# Patient Record
Sex: FEMALE | Race: WHITE
[De-identification: names, ages, dates, MRNs, and addresses within clinical notes are randomized per-mention and may not be internally consistent; named-entity substitution may affect disease eponyms.]

---

## 2018-01-18 ENCOUNTER — HOSPITAL ENCOUNTER (OUTPATIENT)
Dept: HOSPITAL 17 - NEPC | Age: 53
Setting detail: OBSERVATION
LOS: 1 days | Discharge: HOME | End: 2018-01-19
Attending: HOSPITALIST | Admitting: HOSPITALIST
Payer: COMMERCIAL

## 2018-01-18 VITALS
TEMPERATURE: 98.6 F | HEART RATE: 98 BPM | SYSTOLIC BLOOD PRESSURE: 143 MMHG | RESPIRATION RATE: 18 BRPM | DIASTOLIC BLOOD PRESSURE: 72 MMHG | OXYGEN SATURATION: 95 %

## 2018-01-18 VITALS
DIASTOLIC BLOOD PRESSURE: 63 MMHG | TEMPERATURE: 99.5 F | HEART RATE: 102 BPM | RESPIRATION RATE: 17 BRPM | SYSTOLIC BLOOD PRESSURE: 132 MMHG | OXYGEN SATURATION: 94 %

## 2018-01-18 VITALS
DIASTOLIC BLOOD PRESSURE: 68 MMHG | OXYGEN SATURATION: 100 % | RESPIRATION RATE: 18 BRPM | TEMPERATURE: 98.1 F | SYSTOLIC BLOOD PRESSURE: 144 MMHG | HEART RATE: 89 BPM

## 2018-01-18 VITALS — HEIGHT: 64 IN | BODY MASS INDEX: 27.1 KG/M2 | WEIGHT: 158.73 LBS

## 2018-01-18 VITALS
OXYGEN SATURATION: 97 % | HEART RATE: 67 BPM | RESPIRATION RATE: 18 BRPM | DIASTOLIC BLOOD PRESSURE: 69 MMHG | SYSTOLIC BLOOD PRESSURE: 132 MMHG

## 2018-01-18 VITALS — HEART RATE: 93 BPM

## 2018-01-18 VITALS
OXYGEN SATURATION: 100 % | HEART RATE: 75 BPM | RESPIRATION RATE: 18 BRPM | DIASTOLIC BLOOD PRESSURE: 72 MMHG | SYSTOLIC BLOOD PRESSURE: 156 MMHG

## 2018-01-18 VITALS — OXYGEN SATURATION: 97 %

## 2018-01-18 VITALS — TEMPERATURE: 97.8 F

## 2018-01-18 DIAGNOSIS — Z90.710: ICD-10-CM

## 2018-01-18 DIAGNOSIS — N17.9: ICD-10-CM

## 2018-01-18 DIAGNOSIS — E78.5: ICD-10-CM

## 2018-01-18 DIAGNOSIS — D72.829: ICD-10-CM

## 2018-01-18 DIAGNOSIS — T42.4X1A: ICD-10-CM

## 2018-01-18 DIAGNOSIS — Z72.0: ICD-10-CM

## 2018-01-18 DIAGNOSIS — T40.2X1A: ICD-10-CM

## 2018-01-18 DIAGNOSIS — Z63.0: ICD-10-CM

## 2018-01-18 DIAGNOSIS — F32.9: ICD-10-CM

## 2018-01-18 DIAGNOSIS — F05: ICD-10-CM

## 2018-01-18 DIAGNOSIS — I10: ICD-10-CM

## 2018-01-18 DIAGNOSIS — G93.41: Primary | ICD-10-CM

## 2018-01-18 LAB
ALBUMIN SERPL-MCNC: 3.9 GM/DL (ref 3.4–5)
ALP SERPL-CCNC: 91 U/L (ref 45–117)
ALT SERPL-CCNC: 23 U/L (ref 10–53)
APAP SERPL-MCNC: (no result) MCG/ML (ref 10–30)
AST SERPL-CCNC: 33 U/L (ref 15–37)
BASOPHILS # BLD AUTO: 0 TH/MM3 (ref 0–0.2)
BASOPHILS NFR BLD: 0.2 % (ref 0–2)
BILIRUB SERPL-MCNC: 0.3 MG/DL (ref 0.2–1)
BUN SERPL-MCNC: 20 MG/DL (ref 7–18)
CALCIUM SERPL-MCNC: 9.4 MG/DL (ref 8.5–10.1)
CHLORIDE SERPL-SCNC: 104 MEQ/L (ref 98–107)
CREAT SERPL-MCNC: 1.34 MG/DL (ref 0.5–1)
EOSINOPHIL # BLD: 0 TH/MM3 (ref 0–0.4)
EOSINOPHIL NFR BLD: 0 % (ref 0–4)
ERYTHROCYTE [DISTWIDTH] IN BLOOD BY AUTOMATED COUNT: 13.5 % (ref 11.6–17.2)
GFR SERPLBLD BASED ON 1.73 SQ M-ARVRAT: 41 ML/MIN (ref 89–?)
GLUCOSE SERPL-MCNC: 101 MG/DL (ref 74–106)
HCO3 BLD-SCNC: 26.2 MEQ/L (ref 21–32)
HCT VFR BLD CALC: 40.6 % (ref 35–46)
HGB BLD-MCNC: 13.4 GM/DL (ref 11.6–15.3)
INR PPP: 1.1 RATIO
LYMPHOCYTES # BLD AUTO: 2 TH/MM3 (ref 1–4.8)
LYMPHOCYTES NFR BLD AUTO: 12.1 % (ref 9–44)
MCH RBC QN AUTO: 30.9 PG (ref 27–34)
MCHC RBC AUTO-ENTMCNC: 33 % (ref 32–36)
MCV RBC AUTO: 93.7 FL (ref 80–100)
MONOCYTE #: 1.2 TH/MM3 (ref 0–0.9)
MONOCYTES NFR BLD: 7.4 % (ref 0–8)
NEUTROPHILS # BLD AUTO: 13.3 TH/MM3 (ref 1.8–7.7)
NEUTROPHILS NFR BLD AUTO: 80.3 % (ref 16–70)
PLATELET # BLD: 450 TH/MM3 (ref 150–450)
PMV BLD AUTO: 8.2 FL (ref 7–11)
PROT SERPL-MCNC: 7.6 GM/DL (ref 6.4–8.2)
PROTHROMBIN TIME: 10.7 SEC (ref 9.8–11.6)
RBC # BLD AUTO: 4.34 MIL/MM3 (ref 4–5.3)
SODIUM SERPL-SCNC: 139 MEQ/L (ref 136–145)
WBC # BLD AUTO: 16.6 TH/MM3 (ref 4–11)

## 2018-01-18 PROCEDURE — G0378 HOSPITAL OBSERVATION PER HR: HCPCS

## 2018-01-18 PROCEDURE — 96361 HYDRATE IV INFUSION ADD-ON: CPT

## 2018-01-18 PROCEDURE — 96372 THER/PROPH/DIAG INJ SC/IM: CPT

## 2018-01-18 PROCEDURE — 71045 X-RAY EXAM CHEST 1 VIEW: CPT

## 2018-01-18 PROCEDURE — 80048 BASIC METABOLIC PNL TOTAL CA: CPT

## 2018-01-18 PROCEDURE — 96374 THER/PROPH/DIAG INJ IV PUSH: CPT

## 2018-01-18 PROCEDURE — 80307 DRUG TEST PRSMV CHEM ANLYZR: CPT

## 2018-01-18 PROCEDURE — 96375 TX/PRO/DX INJ NEW DRUG ADDON: CPT

## 2018-01-18 PROCEDURE — 85610 PROTHROMBIN TIME: CPT

## 2018-01-18 PROCEDURE — 80076 HEPATIC FUNCTION PANEL: CPT

## 2018-01-18 PROCEDURE — 99285 EMERGENCY DEPT VISIT HI MDM: CPT

## 2018-01-18 PROCEDURE — 80053 COMPREHEN METABOLIC PANEL: CPT

## 2018-01-18 PROCEDURE — 85025 COMPLETE CBC W/AUTO DIFF WBC: CPT

## 2018-01-18 PROCEDURE — 85730 THROMBOPLASTIN TIME PARTIAL: CPT

## 2018-01-18 PROCEDURE — 93005 ELECTROCARDIOGRAM TRACING: CPT

## 2018-01-18 PROCEDURE — 97162 PT EVAL MOD COMPLEX 30 MIN: CPT

## 2018-01-18 RX ADMIN — STANDARDIZED SENNA CONCENTRATE AND DOCUSATE SODIUM SCH TAB: 8.6; 5 TABLET, FILM COATED ORAL at 20:47

## 2018-01-18 RX ADMIN — PHENYTOIN SODIUM SCH MLS/HR: 50 INJECTION INTRAMUSCULAR; INTRAVENOUS at 11:55

## 2018-01-18 RX ADMIN — PHENYTOIN SODIUM SCH MLS/HR: 50 INJECTION INTRAMUSCULAR; INTRAVENOUS at 21:00

## 2018-01-18 RX ADMIN — Medication SCH ML: at 20:47

## 2018-01-18 SDOH — SOCIAL STABILITY - SOCIAL INSECURITY: PROBLEMS IN RELATIONSHIP WITH SPOUSE OR PARTNER: Z63.0

## 2018-01-18 NOTE — PD.PSY.CON
Provisional Diagnosis


Admission Date


Jan 18, 2018 at 10:33


[place].


Major depressive disorder, recurrent, severe, delirium due to benzodiazepine 

overdose


[place] II.


Deferred





History of Present Illness


Service


Psychiatry


Consult Requested By


ER team


Reason for Consult


SI


Primary Care Physician


Isatu Ramos MD


HPI


The patient is a 53 year-old woman domiciled [place]  in [place], 

self employed, with psychiatry history of depression and anxiety , no 

hospitalizations, no SA, she is in Wellbutrin 100 mg bid prescribed by PCP,  

medical history of right subclavian steal syndrome, HTN, HLD,  who presents to 

[facility] with AMS.  Patient was found by her  early this morning 

in her bed unarousable with benzodiazepines and opiates at her beside.  EMS was 

called to the house and patient had GCS score of 8.  Her  is at the 

bedside and due to the patients cognitive impairment, all history is obtained 

from discussion with the , ED physician and review of the medical 

record.  The  states they have been having some martial difficulties and 

he has not been staying at the house but still goes to check on her.  He went 

in to check on her this morning and was not able to get her to wake up.  He 

denies that she drinks any alcohol or uses any illicit drugs.  In the ED, 

patients GCS was around 12.  Her UDS positive for opiates and benzodiazepines. 

On psychiatric evaluation patient is non arausable, sedated, no able to 

participate in the interview.  I have collateral information from her  

Chandu Jordan.  He says that the patient has been in a lot of stress, 

overwhelmed and anxious in the last week.  He says that he has an strong 

suspicion that she overdosed with suicidal intentions.  He relates that they 

have been marital problems for months now, but in the last week does probably 

have exacerbated and few days ago he left the house and asked her for divorce.  

He clarifies that the patient has history of anxiety and depression and she is 

in psychotropics, but he doesn't know the name of the medicine and strength.





Review of Systems


ROS Limitations:  Unresponsive, Uncooperative





Past Family Social History


Coded Allergies:  


     Sulfa (Sulfonamide Antibiotics) (Unverified  Allergy, Severe, 8/15/17)


Reported Medications


Simvastatin (Simvastatin) 20 Mg Tab, 20 MG PO HS


   7/10/13


Discontinued Reported Medications


Bupropion Hcl (Wellbutrin Sr) 100 Mg Tab, 100 MG PO DAILY, TAB


   4/25/16


Zolpidem Tartrate (Zolpidem Tartrate) 10 Mg Tab, 10 MG PO HS


   7/10/13


[Estrgn Methtest ]   No Conflict Check, PO DAILY


   7/10/13





Current Medications








 Medications


  (Trade)  Dose


 Ordered  Sig/Maverick


 Route  Start Time


 Stop Time Status Last Admin


 


  (NS Flush)  2 ml  UNSCH  PRN


 IVF  1/18/18 07:30


     


 


 


 Sodium Chloride  1,000 ml @ 


 100 mls/hr  Q10H


 IV  1/18/18 11:00


    1/18/18 11:55


 


 


  (NS Flush)  2 ml  UNSCH  PRN


 IV FLUSH  1/18/18 10:30


     


 


 


  (NS Flush)  2 ml  BID


 IV FLUSH  1/18/18 21:00


     


 


 


  (Tylenol)  650 mg  Q4H  PRN


 PO  1/18/18 10:30


     


 


 


  (Zofran Inj)  4 mg  Q6H  PRN


 IVP  1/18/18 10:30


     


 


 


  (Lovenox Inj)  40 mg  Q24H


 SQ  1/18/18 12:00


    1/18/18 11:55


 


 


  (Narcan Inj)  0.4 mg  UNSCH  PRN


 IV PUSH  1/18/18 10:30


     


 


 


  (Bree-Colace)  1 tab  BID


 PO  1/18/18 21:00


     


 


 


  (Milk Of


 Magnesia Liq)  30 ml  Q12H  PRN


 PO  1/18/18 10:30


     


 


 


  (Senokot)  17.2 mg  Q12H  PRN


 PO  1/18/18 10:30


     


 


 


  (Dulcolax Supp)  10 mg  DAILY  PRN


 RECTAL  1/18/18 10:30


     


 


 


  (Lactulose Liq)  30 ml  DAILY  PRN


 PO  1/18/18 10:30


     


 








Family Psych History


No family psychiatric history


Social History


Patient was born and raised in [place], she lives in [place] with 

, is self employed, her highest level of education is has


Patient's Strengths (min. 2)


Family support





Physical Exam


Patient is the sedated


Vital Signs





Vital Signs








  Date Time  Temp Pulse Resp B/P (MAP) Pulse Ox O2 Delivery O2 Flow Rate FiO2


 


1/18/18 12:48  67 18 132/69 (90) 97 Room Air  


 


1/18/18 10:56        21


 


1/18/18 07:29 97.8       














I/O   


 


 1/18/18 1/18/18 1/19/18





 08:00 16:00 00:00


 


Intake Total  1000 ml 


 


Balance  1000 ml 








Lab Results











Test


  1/18/18


07:45 1/18/18


09:30


 


White Blood Count 16.6 TH/MM3  


 


Red Blood Count 4.34 MIL/MM3  


 


Hemoglobin 13.4 GM/DL  


 


Hematocrit 40.6 %  


 


Mean Corpuscular Volume 93.7 FL  


 


Mean Corpuscular Hemoglobin 30.9 PG  


 


Mean Corpuscular Hemoglobin


Concent 33.0 % 


  


 


 


Red Cell Distribution Width 13.5 %  


 


Platelet Count 450 TH/MM3  


 


Mean Platelet Volume 8.2 FL  


 


Neutrophils (%) (Auto) 80.3 %  


 


Lymphocytes (%) (Auto) 12.1 %  


 


Monocytes (%) (Auto) 7.4 %  


 


Eosinophils (%) (Auto) 0.0 %  


 


Basophils (%) (Auto) 0.2 %  


 


Neutrophils # (Auto) 13.3 TH/MM3  


 


Lymphocytes # (Auto) 2.0 TH/MM3  


 


Monocytes # (Auto) 1.2 TH/MM3  


 


Eosinophils # (Auto) 0.0 TH/MM3  


 


Basophils # (Auto) 0.0 TH/MM3  


 


CBC Comment DIFF FINAL  


 


Differential Comment   


 


Prothrombin Time 10.7 SEC  


 


Prothromb Time International


Ratio 1.1 RATIO 


  


 


 


Activated Partial


Thromboplast Time 20.0 SEC 


  


 


 


Blood Urea Nitrogen 20 MG/DL  


 


Creatinine 1.34 MG/DL  


 


Random Glucose 101 MG/DL  


 


Total Protein 7.6 GM/DL  


 


Albumin 3.9 GM/DL  


 


Calcium Level 9.4 MG/DL  


 


Alkaline Phosphatase 91 U/L  


 


Aspartate Amino Transf


(AST/SGOT) 33 U/L 


  


 


 


Alanine Aminotransferase


(ALT/SGPT) 23 U/L 


  


 


 


Total Bilirubin 0.3 MG/DL  


 


Sodium Level 139 MEQ/L  


 


Potassium Level 3.8 MEQ/L  


 


Chloride Level 104 MEQ/L  


 


Carbon Dioxide Level 26.2 MEQ/L  


 


Anion Gap 9 MEQ/L  


 


Estimat Glomerular Filtration


Rate 41 ML/MIN 


  


 


 


Salicylates Level


  LESS THAN 1.7


MG/DL 


 


 


Acetaminophen Level


  LESS THAN 2.0


MCG/ML 


 


 


Ethyl Alcohol Level


  LESS THAN 3


MG/DL 


 


 


Urine Opiates Screen  POS 


 


Urine Barbiturates Screen  NEG 


 


Urine Amphetamines Screen  NEG 


 


Urine Benzodiazepines Screen  POS 


 


Urine Cocaine Screen  NEG 


 


Urine Cannabinoids Screen  NEG 











Mental Status Examination


Appearance:  Disheveled (Limited MSE due to level of sedation )


Consciousness:  Lethargic


Suicidal Ideation:  Yes





Assessment & Plan


Problem List:  


(1) Delirium due to another medical condition


ICD Codes:  F05 - Delirium due to known physiological condition


(2) Benzodiazepine (tranquilizer) overdose


ICD Codes:  T42.4X1A - Poisoning by benzodiazepines, accidental (unintentional)

, initial encounter


Status:  Acute


(3) Major depressive disorder, single episode


ICD Codes:  F32.9 - Major depressive disorder, single episode, unspecified


Assessment & Plan:  At the moment of the evaluation patient is deeply slept, 

sedated after OD with benzodiazepines. Patient has been reportedly disorganized 

and agitated on her arrival in the ER mos probably due to delirium secondary to 

benzodiazepines intoxication.  As patient's  is highly like that she 

overdosed with suicidal intentions because he left the house last Saturday and 

Wednesday they have an argument again and she suggested she was to commit 

suicide. At this moment patient represents an acute risk of danger to self and 

she meets criteria for involuntary psychiatric admission for stabilization. 

Dimitri reza act the patient. CIWA protocol. Follow up poison control protocol. 

Case was discussed with Dr. Sesay. Transfer to [facility] once medically 

apposite. 
























































Assessment & Plan


Estimated LOS:  days





Problem Qualifiers





(1) Benzodiazepine (tranquilizer) overdose:  


Qualified Codes:  T42.4X4A - Poisoning by benzodiazepines, undetermined, 

initial encounter








Guevara Estrada MD Jan 18, 2018 14:00

## 2018-01-18 NOTE — RADRPT
EXAM DATE/TIME:  01/18/2018 07:50 

 

[facility] COMPARISON:     

No previous studies available for comparison.

 

                     

INDICATIONS :     

Short of breath; AMS. 

                     

 

MEDICAL HISTORY :            

Unobtainable.    

 

SURGICAL HISTORY :        

Unobtainable. 

 

ENCOUNTER:     

Initial                                        

 

ACUITY:     

1 day      

 

PAIN SCORE:     

Non-responsive.

 

LOCATION:     

Bilateral chest 

 

FINDINGS:     

Portable AP view of the chest demonstrates a normal-sized cardiac silhouette. There is symmetric biap
ical pleural-parenchymal opacity, likely representing scar. No pleural effusion, airspace consolidati
on, or pneumothorax identified. The bones and soft tissues demonstrate no acute finding.

 

CONCLUSION:     

Symmetric biapical opacity likely representing pleural parenchymal scar. Otherwise, no acute finding 
is identified.

 

 

 

 Jerod Turpin MD on January 18, 2018 at 8:07           

Board Certified Radiologist.

 This report was verified electronically.

## 2018-01-18 NOTE — HHI.HP
__________________________________________________





HPI


Service


[facility]


Primary Care Physician


Isatu Ramos MD


Admission Diagnosis





Polysubstance OD; AMS


Diagnoses:  


Chief Complaint:  


AMS


Travel History


International Travel<30 Days:  No


Contact w/Intl Traveler <30 Da:  No


Traveled to Known Affected Are:  No


History of Present Illness


Written by Katelyn Carpio, acting as scribe for Dr. Sesay on 18 at 11:

53. 





This is a 54yo female with PMHX significant for right subclavian steal syndrome

, HTN, HLD and depression [facility].  Patient was 

found by her  early this morning in her bed unarousable with 

benzodiazepines and opiates at her beside.  EMS was called to the house and 

patient had GCS score of 8.  Her  is at the bedside and due to the 

patients cognitive impairment, all history is obtained from discussion with the 

, ED physician and review of the medical record.  The  states 

they have been having some martial difficulties and he has not been staying at 

the house but still goes to check on her.  He went in to check on her this 

morning and was not able to get her to wake up.  He denies that she drinks any 

alcohol or uses any illicit drugs.  In the ED, patients GCS was around 12.  Her 

UDS positive for opiates and benzodiazepines.





Review of Systems


10 point review of systems attempted but unable to complete due to patients 

altered mental status





Past Family Social History


Past Medical History


HTN


HLD


Right subclavian steal syndrome


Depression


Past Surgical History


Breast augmentation


Hysterectomy


Angioplasty right subclavian 2013


Reported Medications


Wellbutrin Sr (Bupropion HCl) 100 Mg Tab 100 Mg PO DAILY 


Simvastatin 20 Mg Tab 20 Mg PO HS 


Zolpidem Tartrate 10 Mg Tab 10 Mg PO HS 


[Estrgn Methtest ]    PO DAILY


Allergies:  


Coded Allergies:  


     Sulfa (Sulfonamide Antibiotics) (Unverified  Allergy, Severe, 8/15/17)


Active Ordered Medications





Current Medications








 Medications


  (Trade)  Dose


 Ordered  Sig/Maverick


 Route  Start Time


 Stop Time Status Last Admin


 


  (NS Flush)  2 ml  UNSCH  PRN


 IVF  18 07:30


     


 


 


 Sodium Chloride  1,000 ml @ 


 100 mls/hr  Q10H


 IV  18 11:00


     


 


 


  (NS Flush)  2 ml  UNSCH  PRN


 IV FLUSH  18 10:30


     


 


 


  (NS Flush)  2 ml  BID


 IV FLUSH  18 21:00


     


 


 


  (Tylenol)  650 mg  Q4H  PRN


 PO  18 10:30


     


 


 


  (Zofran Inj)  4 mg  Q6H  PRN


 IVP  18 10:30


     


 


 


  (Lovenox Inj)  40 mg  Q24H


 SQ  18 12:00


     


 


 


  (Narcan Inj)  0.4 mg  UNSCH  PRN


 IV PUSH  18 10:30


     


 


 


  (Bree-Colace)  1 tab  BID


 PO  18 21:00


     


 


 


  (Milk Of


 Magnesia Liq)  30 ml  Q12H  PRN


 PO  18 10:30


     


 


 


  (Senokot)  17.2 mg  Q12H  PRN


 PO  18 10:30


     


 


 


  (Dulcolax Supp)  10 mg  DAILY  PRN


 RECTAL  18 10:30


     


 


 


  (Lactulose Liq)  30 ml  DAILY  PRN


 PO  18 10:30


     


 








Family History


Mother, CVA


Father, lung cancer


Hypertension


Coronary artery disease


Social History


Patient has a history of tobacco use of a half pack per day.  Denies any 

alcohol use or illicit drug use.





Physical Exam


Vital Signs





Vital Signs








  Date Time  Temp Pulse Resp B/P (MAP) Pulse Ox O2 Delivery O2 Flow Rate FiO2


 


18 10:56     97   21


 


18 07:52     97 Room Air  


 


18 07:52     97 Room Air  


 


18 07:29 97.8       








Physical Exam


GENERAL: This is a well-nourished, well-developed  female patient.  

Lethargic.  Confused.  Mumbling incomprehensibly.   at the bedside.


SKIN: No rashes, ecchymoses or lesions. Cool and dry.


HEAD: Atraumatic. Normocephalic. No temporal or scalp tenderness.


EYES: Pupils equal round and reactive. No scleral icterus. No injection or 

drainage. 


ENT: Nose without bleeding or purulent drainage. Throat without erythema, 

tonsillar hypertrophy or exudate. Uvula midline. Airway patent.  Dry mucus 

membranes.


NECK: Trachea midline. No lymphadenopathy. Supple, nontender, no meningeal 

signs.


CARDIOVASCULAR: Regular rate and rhythm without murmurs, gallops, or rubs. 


RESPIRATORY: Poor effort.  Clear to auscultation. Breath sounds equal 

bilaterally. No wheezes, rales, or rhonchi.  


GASTROINTESTINAL: Abdomen soft, non-tender, nondistended. No hepato-splenomegaly

, or palpable masses. No guarding.


MUSCULOSKELETAL: Extremities without clubbing, cyanosis, or edema. No joint 

tenderness, effusion, or edema noted. No calf tenderness. 


NEUROLOGICAL: Lethargic.  Does not follow simple commands.  Able to move all 

extremities spontaneously.


Laboratory





Laboratory Tests








Test


  18


07:45 18


09:30


 


White Blood Count 16.6  


 


Red Blood Count 4.34  


 


Hemoglobin 13.4  


 


Hematocrit 40.6  


 


Mean Corpuscular Volume 93.7  


 


Mean Corpuscular Hemoglobin 30.9  


 


Mean Corpuscular Hemoglobin


Concent 33.0 


  


 


 


Red Cell Distribution Width 13.5  


 


Platelet Count 450  


 


Mean Platelet Volume 8.2  


 


Neutrophils (%) (Auto) 80.3  


 


Lymphocytes (%) (Auto) 12.1  


 


Monocytes (%) (Auto) 7.4  


 


Eosinophils (%) (Auto) 0.0  


 


Basophils (%) (Auto) 0.2  


 


Neutrophils # (Auto) 13.3  


 


Lymphocytes # (Auto) 2.0  


 


Monocytes # (Auto) 1.2  


 


Eosinophils # (Auto) 0.0  


 


Basophils # (Auto) 0.0  


 


CBC Comment DIFF FINAL  


 


Differential Comment   


 


Prothrombin Time 10.7  


 


Prothromb Time International


Ratio 1.1 


  


 


 


Activated Partial


Thromboplast Time 20.0 


  


 


 


Blood Urea Nitrogen 20  


 


Creatinine 1.34  


 


Random Glucose 101  


 


Total Protein 7.6  


 


Albumin 3.9  


 


Calcium Level 9.4  


 


Alkaline Phosphatase 91  


 


Aspartate Amino Transf


(AST/SGOT) 33 


  


 


 


Alanine Aminotransferase


(ALT/SGPT) 23 


  


 


 


Total Bilirubin 0.3  


 


Sodium Level 139  


 


Potassium Level 3.8  


 


Chloride Level 104  


 


Carbon Dioxide Level 26.2  


 


Anion Gap 9  


 


Estimat Glomerular Filtration


Rate 41 


  


 


 


Salicylates Level LESS THAN 1.7  


 


Acetaminophen Level LESS THAN 2.0  


 


Ethyl Alcohol Level LESS THAN 3  


 


Urine Opiates Screen  POS 


 


Urine Barbiturates Screen  NEG 


 


Urine Amphetamines Screen  NEG 


 


Urine Benzodiazepines Screen  POS 


 


Urine Cocaine Screen  NEG 


 


Urine Cannabinoids Screen  NEG 








Result Diagram:  


18 0745                                                                   

             1845





Imaging





Last Impressions








Chest X-Ray 18 Signed





Impressions: 





 Service Date/Time:   07:50 - CONCLUSION:  Symmetric 





 biapical opacity likely representing pleural parenchymal scar. Otherwise, no 





 acute finding is identified.     John W. Gianini, MD Caprini VTE Risk Assessment


Caprini VTE Risk Assessment:  Mod/High Risk (score >= 2)


Caprini Risk Assessment Model











 Point Value = 1          Point Value = 2  Point Value = 3  Point Value = 5


 


Age 41-60


Minor surgery


BMI > 25 kg/m2


Swollen legs


Varicose veins


Pregnancy or postpartum


History of unexplained or recurrent


   spontaneous 


Oral contraceptives or hormone


   replacement


Sepsis (< 1 month)


Serious lung disease, including


   pneumonia (< 1 month)


Abnormal pulmonary function


Acute myocardial infarction


Congestive heart failure (< 1 month)


History of inflammatory bowel disease


Medical patient at bed rest Age 61-74


Arthroscopic surgery


Major open surgery (> 45 min)


Laparoscopic surgery (> 45 min)


Malignancy


Confined to bed (> 72 hours)


Immobilizing plaster cast


Central venous access Age >= 75


History of VTE


Family history of VTE


Factor V Leiden


Prothrombin 52033F


Lupus anticoagulant


Anticardiolipin antibodies


Elevated serum homocysteine


Heparin-induced thrombocytopenia


Other congenital or acquired


   thrombophilia Stroke (< 1 month)


Elective arthroplasty


Hip, pelvis, or leg fracture


Acute spinal cord injury (< 1 month)








Prophylaxis Regimen











   Total Risk


Factor Score Risk Level Prophylaxis Regimen


 


0-1      Low Early ambulation


 


2 Moderate Order ONE of the following:


*Sequential Compression Device (SCD)


*Heparin 5000 units SQ BID


 


3-4 Higher Order ONE of the following medications:


*Heparin 5000 units SQ TID


*Enoxaparin/Lovenox 40 mg SQ daily (WT < 150 kg, CrCl > 30 mL/min)


*Enoxaparin/Lovenox 30 mg SQ daily (WT < 150 kg, CrCl > 10-29 mL/min)


*Enoxaparin/Lovenox 30 mg SQ BID (WT < 150 kg, CrCl > 30 mL/min)


AND/OR


*Sequential Compression Device (SCD)


 


5 or more Highest Order ONE of the following medications:


*Heparin 5000 units SQ TID (Preferred with Epidurals)


*Enoxaparin/Lovenox 40 mg SQ daily (WT < 150 kg, CrCl > 30 mL/min)


*Enoxaparin/Lovenox 30 mg SQ daily (WT < 150 kg, CrCl > 10-29 mL/min)


*Enoxaparin/Lovenox 30 mg SQ BID (WT < 150 kg, CrCl > 30 mL/min)


AND


*Sequential Compression Device (SCD)











Assessment and Plan


Assessment and Plan


54yo female with PMHX significant for right subclavian steal syndrome, HTN, HLD 

and depression [facility].  Patient was found by 

her  early this morning in her bed unarousable with benzodiazepines and 

opiates at her beside.





Altered mental status/metabolic encephalopathy


Possible intentional drug overdose


History of depression


   - UDS positive for opiates and benzodiazepine.  Neg for ethyl alcohol.


   - Psychiatry consulted by ED, appreciate recommendations


   - Avoid all sedating medications


   - Neuro checks


   - IVF


   - Monitor respiratory status


   -Baker act 





Acute kidney injury


   - suspect secondary to dehydration/poor oral intake


   - Creatinine 1.34, GFR 41


   - Avoid nephrotoxic agent


   - IVF


   - Monitor kidney function





Leukocytosis


   - suspect reactive


   - patient is afebrile.  CXR reviewed by me, no acute cardiopulmonary process


   - monitor white count





Hypertension/Dyslipidemia


   - will resume home medications once med reconciliation updated





DVT prophylaxis


   - Lovenox subcutaneous








This note was transcribed by ERASMO Ha .  I, Dr. Annette Sesay 

personally performed the history, physical exam, and medical decision making; 

and confirmed the accuracy of the information in the transcribed note.





Authenticated by Dr. Annette Sesay on 18 at 11:53.


Discussed Condition With


ED physician, patient, , Katelyn Perez 2018 11:58


Annette Sesay MD 2018 13:46

## 2018-01-18 NOTE — EKG
Date Performed: 01/18/2018       Time Performed: 11:50:37

 

PTAGE:      53 years

 

EKG:      Sinus rhythm 

 

 NORMAL ECG

 

PREVIOUS TRACING       : 07/10/2013 13.03

 

DOCTOR:   Jeremy Barney  Interpretating Date/Time  01/18/2018 18:53:34

## 2018-01-18 NOTE — EKG
Date Performed: 01/18/2018       Time Performed: 16:35:30

 

PTAGE:      53 years

 

EKG:      Sinus rhythm 

 

 MINIMAL ST DEPRESSION BORDERLINE ECG No significant change from prior electrocardiogram. 

 

 PREVIOUS TRACING            : 01/18/2018 11.50

 

DOCTOR:   Holden Howard  Interpretating Date/Time  01/18/2018 19:38:06

## 2018-01-18 NOTE — PD
HPI


Chief Complaint:  Altered mental status


Time Seen by Provider:  07:29


Travel History


International Travel<30 days:  No


Contact w/Intl Traveler<30days:  No





History of Present Illness


HPI


53-year-old female arrived to the ER by EMS secondary to altered mental status.

  She has found by her  who works nights to have been asleep in her bed 

in very slow to arousal with tactile stimulus.  EMS reports hydrocodone in 

lorazepam prescriptions at the bedside.  EMS an odor of alcohol or seeing 

alcohol on scene.  Initially a GCS of 8.  After Narcan it improved to about 10 

or so.  The blood glucose in vital signs were normal aside from a respiratory 

rate of 6 initially.





PFSH


Past Medical History


Depression:  Yes


Cancer:  No


Cardiovascular Problems:  Yes (HISTORY OF RT SUBCLAVIAN STEAL SYNDROME)


High Cholesterol:  Yes


Chest Pain:  Yes


Diminished Hearing:  No


Endocrine:  No


Genitourinary:  No


Hypertension:  Yes


Immune Disorder:  No


Musculoskeletal:  No


Neurologic:  No


Psychiatric:  Yes


Reproductive:  No


Respiratory:  No


Menopausal:  Yes





Past Surgical History


Hysterectomy:  Yes


Thoracic Surgery:  Yes (BREAST AUGMENTATION)





Social History


Alcohol Use:  No


Tobacco Use:  Yes (1/2PPD)


Substance Use:  No





Allergies-Medications


(Allergen,Severity, Reaction):  


Coded Allergies:  


     Sulfa (Sulfonamide Antibiotics) (Unverified  Allergy, Severe, 8/15/17)


Reported Meds & Prescriptions





Reported Meds & Active Scripts


Active


Reported


Wellbutrin Sr (Bupropion HCl) 100 Mg Tab 100 Mg PO DAILY 


Simvastatin 20 Mg Tab 20 Mg PO HS 


Zolpidem Tartrate 10 Mg Tab 10 Mg PO HS 


[Estrgn Methtest ]    PO DAILY 








Review of Systems


ROS Limitations:  Clinical Condition





Physical Exam


Narrative


GENERAL: 53-year-old female well-nourished well-developed a GCS 12


SKIN: Warm and dry.  No evidence IV drug abuse.


HEAD: Atraumatic. Normocephalic. 


EYES:   No scleral icterus. No injection or drainage.  Pupils equal round 

reactive to light.  Pupils about 3 mm.


ENT: No nasal bleeding or discharge.  Mucous membranes pink and moist.


NECK: Trachea midline. No JVD. 


CARDIOVASCULAR: Heart rate about 100.  The rhythm is regular


RESPIRATORY: No accessory muscle use. Clear to auscultation. Breath sounds 

equal bilaterally. 


GASTROINTESTINAL: Abdomen soft, non-tender, nondistended. Hepatic and splenic 

margins not palpable. 


MUSCULOSKELETAL: Extremities without clubbing, cyanosis, or edema. No obvious 

deformities. 


NEUROLOGICAL: GCS is 12  (eyes 4, verbal 4, motor 4).  No focal cranial nerve 

deficit.


PSYCHIATRIC: Presumed polysubstance ingestion of indeterminate intent.  Unable 

to assess further.





Data


Data


Last Documented VS





Vital Signs








  Date Time  Temp Pulse Resp B/P (MAP) Pulse Ox O2 Delivery O2 Flow Rate FiO2


 


1/18/18 07:52     97 Room Air  


 


1/18/18 07:29 97.8       








Orders





 Orders


Complete Blood Count With Diff (1/18/18 07:29)


Comprehensive Metabolic Panel (1/18/18 07:29)


Chest, Single Ap (1/18/18 07:29)


Iv Access Insert/Monitor (1/18/18 07:29)


Ecg Monitoring (1/18/18 07:29)


Oximetry (1/18/18 07:29)


Charcoal Activated Liq (Actidose-Aqua Li (1/18/18 07:30)


Naloxone Inj (Narcan Inj) (1/18/18 07:30)


Ondansetron Inj (Zofran Inj) (1/18/18 07:30)


Sodium Chloride 0.9% Flush (Ns Flush) (1/18/18 07:30)


Sodium Chlor 0.9% 1000 Ml Inj (Ns 1000 M (1/18/18 07:29)


Drug Screen, Random Urine (1/18/18 07:29)


Alcohol (Ethanol) (1/18/18 07:29)


Salicylates (Aspirin) (1/18/18 07:29)


Tylenol (Acetaminophen) (1/18/18 07:29)


Prothrombin Time / Inr (Pt) (1/18/18 07:29)


Act Partial Throm Time (Ptt) (1/18/18 07:29)


Psych Screen (1/18/18 08:53)


Sodium Chlor 0.9% 1000 Ml Inj (Ns 1000 M (1/18/18 10:15)


Consult Psychiatry (1/18/18 )


(Hub Use Only)Inp Phy Cons/Ref (1/18/18 )


Admit Order (Ed Use Only) (1/18/18 )


Cardiac Monitor / Telemetry RICHARD.Q8H (1/18/18 10:32)


Vital Signs (Adult) Q4H (1/18/18 10:32)


Activity Bed Rest (1/18/18 10:32)


Place In Observation (1/18/18 )


Vital Signs (Adult) Q4H (1/18/18 10:30)


Activity Oob With Assistance (1/18/18 10:30)


Intake + Output RICHARD.QSHIFT (1/18/18 10:30)


Diet Regular Basic (1/18/18 Lunch)


Sodium Chlor 0.9% 1000 Ml Inj (Ns 1000 M (1/18/18 11:00)


Sodium Chloride 0.9% Flush (Ns Flush) (1/18/18 10:30)


Sodium Chloride 0.9% Flush (Ns Flush) (1/18/18 21:00)


Acetaminophen (Tylenol) (1/18/18 10:30)


Ondansetron Inj (Zofran Inj) (1/18/18 10:30)


Basic Metabolic Panel (Bmp) (1/19/18 06:00)


Complete Blood Count With Diff (1/19/18 06:00)


Electrocardiogram (1/18/18 10:30)


Electrocardiogram (1/18/18 16:30)


Resp Oxygen Mathew C Titrat 1-4 L (1/18/18 )


Pt Request For Service (1/18/18 10:30)


Case Management Consult (1/18/18 10:30)


Enoxaparin Inj (Lovenox Inj) (1/18/18 10:30)


Scd Bilateral/Knee High RICHARD.BID (1/18/18 10:30)


Joe Bilateral/Knee High RICHARD.QSHIFT (1/18/18 10:30)


Naloxone Inj (Narcan Inj) (1/18/18 10:30)


Docusate Sodium-Senna (Bree-Colace) (1/18/18 21:00)


Magnesium Hydroxide Liq (Milk Of Magnesi (1/18/18 10:30)


Sennosides (Senokot) (1/18/18 10:30)


Bisacodyl Supp (Dulcolax Supp) (1/18/18 10:30)


Lactulose Liq (Lactulose Liq) (1/18/18 10:30)





Labs





Laboratory Tests








Test


  1/18/18


07:45 1/18/18


09:30


 


White Blood Count 16.6 TH/MM3  


 


Red Blood Count 4.34 MIL/MM3  


 


Hemoglobin 13.4 GM/DL  


 


Hematocrit 40.6 %  


 


Mean Corpuscular Volume 93.7 FL  


 


Mean Corpuscular Hemoglobin 30.9 PG  


 


Mean Corpuscular Hemoglobin


Concent 33.0 % 


  


 


 


Red Cell Distribution Width 13.5 %  


 


Platelet Count 450 TH/MM3  


 


Mean Platelet Volume 8.2 FL  


 


Neutrophils (%) (Auto) 80.3 %  


 


Lymphocytes (%) (Auto) 12.1 %  


 


Monocytes (%) (Auto) 7.4 %  


 


Eosinophils (%) (Auto) 0.0 %  


 


Basophils (%) (Auto) 0.2 %  


 


Neutrophils # (Auto) 13.3 TH/MM3  


 


Lymphocytes # (Auto) 2.0 TH/MM3  


 


Monocytes # (Auto) 1.2 TH/MM3  


 


Eosinophils # (Auto) 0.0 TH/MM3  


 


Basophils # (Auto) 0.0 TH/MM3  


 


CBC Comment DIFF FINAL  


 


Differential Comment   


 


Prothrombin Time 10.7 SEC  


 


Prothromb Time International


Ratio 1.1 RATIO 


  


 


 


Activated Partial


Thromboplast Time 20.0 SEC 


  


 


 


Blood Urea Nitrogen 20 MG/DL  


 


Creatinine 1.34 MG/DL  


 


Random Glucose 101 MG/DL  


 


Total Protein 7.6 GM/DL  


 


Albumin 3.9 GM/DL  


 


Calcium Level 9.4 MG/DL  


 


Alkaline Phosphatase 91 U/L  


 


Aspartate Amino Transf


(AST/SGOT) 33 U/L 


  


 


 


Alanine Aminotransferase


(ALT/SGPT) 23 U/L 


  


 


 


Total Bilirubin 0.3 MG/DL  


 


Sodium Level 139 MEQ/L  


 


Potassium Level 3.8 MEQ/L  


 


Chloride Level 104 MEQ/L  


 


Carbon Dioxide Level 26.2 MEQ/L  


 


Anion Gap 9 MEQ/L  


 


Estimat Glomerular Filtration


Rate 41 ML/MIN 


  


 


 


Salicylates Level


  LESS THAN 1.7


MG/DL 


 


 


Acetaminophen Level


  LESS THAN 2.0


MCG/ML 


 


 


Ethyl Alcohol Level


  LESS THAN 3


MG/DL 


 


 


Urine Opiates Screen  POS 


 


Urine Barbiturates Screen  NEG 


 


Urine Amphetamines Screen  NEG 


 


Urine Benzodiazepines Screen  POS 


 


Urine Cocaine Screen  NEG 


 


Urine Cannabinoids Screen  NEG 











MDM


Medical Decision Making


Medical Screen Exam Complete:  Yes


Emergency Medical Condition:  Yes


Differential Diagnosis


Opioid overdose, benzo overdose, liver injury, metabolic disarray, overdose of 

indeterminate content


Narrative Course


CBC & BMP Diagram


1/18/18 07:45








Total Protein 7.6, Albumin 3.9, Calcium Level 9.4, Alkaline Phosphatase 91, 

Aspartate Amino Transf (AST/SGOT) 33, Alanine Aminotransferase (ALT/SGPT) 23, 

Total Bilirubin 0.3





There is no evidence of liver injury at this time.  The patient has been 

monitored here for over 2 hours in has improved somewhat however is not at this 

time considered sufficiently competent neurologically for medical clearance.  

Ongoing monitoring with anticipation of improved mental status considered 

appropriate.  Psych consultation. case d/w Dr Sesay





Diagnosis





 Primary Impression:  


 Altered mental status


 Qualified Codes:  R41.82 - Altered mental status, unspecified


 Additional Impressions:  


 Opioid overdose


 Qualified Codes:  T40.2X4A - Poisoning by other opioids, undetermined, initial 

encounter


 Benzodiazepine (tranquilizer) overdose


 Qualified Codes:  T42.4X4A - Poisoning by benzodiazepines, undetermined, 

initial encounter





Admitting Information


Admitting Physician Requests:  Observation











Jay Pemberton MD Jan 18, 2018 07:31

## 2018-01-19 ENCOUNTER — HOSPITAL ENCOUNTER (INPATIENT)
Dept: HOSPITAL 17 - H260 | Age: 53
LOS: 4 days | Discharge: HOME | DRG: 885 | End: 2018-01-23
Attending: PSYCHIATRY & NEUROLOGY | Admitting: PSYCHIATRY & NEUROLOGY
Payer: COMMERCIAL

## 2018-01-19 VITALS
RESPIRATION RATE: 17 BRPM | TEMPERATURE: 97.9 F | HEART RATE: 106 BPM | OXYGEN SATURATION: 98 % | DIASTOLIC BLOOD PRESSURE: 63 MMHG | SYSTOLIC BLOOD PRESSURE: 143 MMHG

## 2018-01-19 VITALS
HEART RATE: 103 BPM | OXYGEN SATURATION: 94 % | RESPIRATION RATE: 18 BRPM | TEMPERATURE: 99.1 F | DIASTOLIC BLOOD PRESSURE: 53 MMHG | SYSTOLIC BLOOD PRESSURE: 105 MMHG

## 2018-01-19 VITALS
TEMPERATURE: 98.4 F | DIASTOLIC BLOOD PRESSURE: 65 MMHG | RESPIRATION RATE: 16 BRPM | HEART RATE: 91 BPM | OXYGEN SATURATION: 97 % | SYSTOLIC BLOOD PRESSURE: 136 MMHG

## 2018-01-19 VITALS
OXYGEN SATURATION: 95 % | SYSTOLIC BLOOD PRESSURE: 105 MMHG | HEART RATE: 96 BPM | RESPIRATION RATE: 17 BRPM | DIASTOLIC BLOOD PRESSURE: 55 MMHG | TEMPERATURE: 98.3 F

## 2018-01-19 VITALS — OXYGEN SATURATION: 94 %

## 2018-01-19 VITALS
RESPIRATION RATE: 18 BRPM | DIASTOLIC BLOOD PRESSURE: 74 MMHG | OXYGEN SATURATION: 96 % | TEMPERATURE: 99.4 F | SYSTOLIC BLOOD PRESSURE: 146 MMHG | HEART RATE: 105 BPM

## 2018-01-19 VITALS — BODY MASS INDEX: 23.69 KG/M2 | HEIGHT: 59 IN | WEIGHT: 117.51 LBS

## 2018-01-19 VITALS — HEART RATE: 97 BPM

## 2018-01-19 DIAGNOSIS — F33.2: Primary | ICD-10-CM
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
## 2018-01-19 DIAGNOSIS — E78.5: ICD-10-CM

## 2018-01-19 DIAGNOSIS — Z88.2: ICD-10-CM

## 2018-01-19 DIAGNOSIS — R40.4: ICD-10-CM

## 2018-01-19 DIAGNOSIS — F41.9: ICD-10-CM

## 2018-01-19 DIAGNOSIS — I12.9: ICD-10-CM

## 2018-01-19 DIAGNOSIS — G47.00: ICD-10-CM

## 2018-01-19 DIAGNOSIS — B96.1: ICD-10-CM

## 2018-01-19 DIAGNOSIS — N17.9: ICD-10-CM

## 2018-01-19 DIAGNOSIS — N18.3: ICD-10-CM

## 2018-01-19 DIAGNOSIS — N39.0: ICD-10-CM

## 2018-01-19 DIAGNOSIS — E87.6: ICD-10-CM

## 2018-01-19 DIAGNOSIS — Z63.0: ICD-10-CM

## 2018-01-19 DIAGNOSIS — T40.2X2A: ICD-10-CM

## 2018-01-19 DIAGNOSIS — R40.2431: ICD-10-CM

## 2018-01-19 DIAGNOSIS — R10.30: ICD-10-CM

## 2018-01-19 DIAGNOSIS — T42.4X2A: ICD-10-CM

## 2018-01-19 LAB
ALBUMIN SERPL-MCNC: 3.2 GM/DL (ref 3.4–5)
ALP SERPL-CCNC: 82 U/L (ref 45–117)
ALT SERPL-CCNC: 21 U/L (ref 10–53)
AST SERPL-CCNC: 45 U/L (ref 15–37)
BASOPHILS # BLD AUTO: 0 TH/MM3 (ref 0–0.2)
BASOPHILS NFR BLD: 0.4 % (ref 0–2)
BILIRUB INDIRECT SERPL-MCNC: 0.3 MG/DL (ref 0–0.8)
BILIRUB SERPL-MCNC: 0.4 MG/DL (ref 0.2–1)
BUN SERPL-MCNC: 13 MG/DL (ref 7–18)
CALCIUM SERPL-MCNC: 8.4 MG/DL (ref 8.5–10.1)
CHLORIDE SERPL-SCNC: 113 MEQ/L (ref 98–107)
CREAT SERPL-MCNC: 1.21 MG/DL (ref 0.5–1)
DIRECT BILIRUBIN ADULT: 0.1 MG/DL (ref 0–0.2)
EOSINOPHIL # BLD: 0 TH/MM3 (ref 0–0.4)
EOSINOPHIL NFR BLD: 0.2 % (ref 0–4)
ERYTHROCYTE [DISTWIDTH] IN BLOOD BY AUTOMATED COUNT: 13.1 % (ref 11.6–17.2)
GFR SERPLBLD BASED ON 1.73 SQ M-ARVRAT: 47 ML/MIN (ref 89–?)
GLUCOSE SERPL-MCNC: 69 MG/DL (ref 74–106)
HCO3 BLD-SCNC: 26 MEQ/L (ref 21–32)
HCT VFR BLD CALC: 34 % (ref 35–46)
HGB BLD-MCNC: 11.4 GM/DL (ref 11.6–15.3)
LYMPHOCYTES # BLD AUTO: 2.4 TH/MM3 (ref 1–4.8)
LYMPHOCYTES NFR BLD AUTO: 21.6 % (ref 9–44)
MCH RBC QN AUTO: 31.3 PG (ref 27–34)
MCHC RBC AUTO-ENTMCNC: 33.4 % (ref 32–36)
MCV RBC AUTO: 93.5 FL (ref 80–100)
MONOCYTE #: 0.9 TH/MM3 (ref 0–0.9)
MONOCYTES NFR BLD: 8.4 % (ref 0–8)
NEUTROPHILS # BLD AUTO: 7.7 TH/MM3 (ref 1.8–7.7)
NEUTROPHILS NFR BLD AUTO: 69.4 % (ref 16–70)
PLATELET # BLD: 370 TH/MM3 (ref 150–450)
PMV BLD AUTO: 8.3 FL (ref 7–11)
PROT SERPL-MCNC: 6.5 GM/DL (ref 6.4–8.2)
RBC # BLD AUTO: 3.63 MIL/MM3 (ref 4–5.3)
SODIUM SERPL-SCNC: 146 MEQ/L (ref 136–145)
WBC # BLD AUTO: 11.1 TH/MM3 (ref 4–11)

## 2018-01-19 PROCEDURE — 87804 INFLUENZA ASSAY W/OPTIC: CPT

## 2018-01-19 PROCEDURE — 81001 URINALYSIS AUTO W/SCOPE: CPT

## 2018-01-19 PROCEDURE — 87186 SC STD MICRODIL/AGAR DIL: CPT

## 2018-01-19 PROCEDURE — 87077 CULTURE AEROBIC IDENTIFY: CPT

## 2018-01-19 PROCEDURE — 83735 ASSAY OF MAGNESIUM: CPT

## 2018-01-19 PROCEDURE — 80048 BASIC METABOLIC PNL TOTAL CA: CPT

## 2018-01-19 PROCEDURE — 87205 SMEAR GRAM STAIN: CPT

## 2018-01-19 PROCEDURE — 87070 CULTURE OTHR SPECIMN AEROBIC: CPT

## 2018-01-19 PROCEDURE — 87086 URINE CULTURE/COLONY COUNT: CPT

## 2018-01-19 PROCEDURE — 83036 HEMOGLOBIN GLYCOSYLATED A1C: CPT

## 2018-01-19 PROCEDURE — 87040 BLOOD CULTURE FOR BACTERIA: CPT

## 2018-01-19 PROCEDURE — 84132 ASSAY OF SERUM POTASSIUM: CPT

## 2018-01-19 PROCEDURE — 85025 COMPLETE CBC W/AUTO DIFF WBC: CPT

## 2018-01-19 PROCEDURE — 71045 X-RAY EXAM CHEST 1 VIEW: CPT

## 2018-01-19 PROCEDURE — 83880 ASSAY OF NATRIURETIC PEPTIDE: CPT

## 2018-01-19 PROCEDURE — 80061 LIPID PANEL: CPT

## 2018-01-19 RX ADMIN — PRAVASTATIN SODIUM SCH MG: 40 TABLET ORAL at 21:28

## 2018-01-19 RX ADMIN — STANDARDIZED SENNA CONCENTRATE AND DOCUSATE SODIUM SCH TAB: 8.6; 5 TABLET, FILM COATED ORAL at 09:00

## 2018-01-19 RX ADMIN — NICOTINE SCH PATCH: 21 PATCH, EXTENDED RELEASE TOPICAL at 12:00

## 2018-01-19 RX ADMIN — Medication SCH ML: at 09:00

## 2018-01-19 RX ADMIN — PHENYTOIN SODIUM SCH MLS/HR: 50 INJECTION INTRAMUSCULAR; INTRAVENOUS at 07:00

## 2018-01-19 SDOH — SOCIAL STABILITY - SOCIAL INSECURITY: PROBLEMS IN RELATIONSHIP WITH SPOUSE OR PARTNER: Z63.0

## 2018-01-19 NOTE — HHI.HP
Provisional Diagnosis


Admission Date


Jan 19, 2018 at 11:41


Axis I.


Major depressive disorder, recurrent, severe without psychosis, benzodiazepine 

intoxication


Axis II.


Deferred





                               Certification of Person's Competence 


                           To Provide Express and Informed Consent





I have personally examined America Jordan , a person being served at [facility] on, Jan 19, 2018 11:59.


Express and informed consent means consent voluntarily given in writing, by a 

competent person, after sufficient explanation and disclosure of the subject 

matter involved to enable the person to make a knowing and willful decision 

without any element of force, fraud, deceit, duress, or other form of 

constraint or coercion.





This person is 18 years of age or older, is not now known to be incompetent to 

consent to treatment with a guardian advocate, and does not have a health care 

surrogate or proxy currently making medical treatment decisions.  I have found 

this person to be one of the following:





[] Competent to provide express and informed consent, as defined above, for 

voluntary admission to this facility and is competent to provide express and 

informed consent for treatment.  He/she has the consistent capacity to make 

well reasoned, willful, and knowing decisions concerning his or her medical or 

mental health treatment.  The person fully and consistently understands the 

purpose of the admission for examination/placement and is fully capable of 

personally exercising all rights assured [place]





[] Incompetent to provide express and informed consent to voluntary admission, 

and this is incompetent to provide express and informed consent to treatment.  

The person must be transferred to involuntary status and a petition for a 

guardian advocate filed with the Circuit Court.





[x] Refusing to provide express and informed consent to voluntary admission but 

is competent to provide express and informed consent for treatment.  The person 

must be discharged or transferred to involuntary status.





Form shall be completed within 24 hours of a person's arrival at the receiving 

facility and filed in the clinical record of each person:


1. Admitted on a voluntary basis


2. Permitted to provide express and informed consent to his/her own treatment


3. Allowed to transfer from involuntary to voluntary status


4. Prior to permitting a person to consent to his or her own treatment after 

having been previously found incompetent to consent to treatment.





History of Present Illness


Capacity:  Has Capacity


HPI


1/18/2018 The patient is a 53 year-old woman [place], self employed, with psychiatry history of depression and anxiety , no 

hospitalizations, no SA, she is in Wellbutrin 100 mg bid prescribed by PCP,  

medical history of right subclavian steal syndrome, HTN, HLD,  who presents to 

[facility] with AMS.  Patient was found by her  early this morning 

in her bed unarousable with benzodiazepines and opiates at her beside.  EMS was 

called to the house and patient had GCS score of 8.  Her  is at the 

bedside and due to the patients cognitive impairment, all history is obtained 

from discussion with the , ED physician and review of the medical 

record.  The  states they have been having some martial difficulties and 

he has not been staying at the house but still goes to check on her.  He went 

in to check on her this morning and was not able to get her to wake up.  He 

denies that she drinks any alcohol or uses any illicit drugs.  In the ED, 

patients GCS was around 12.  Her UDS positive for opiates and benzodiazepines. 

On psychiatric evaluation patient is non arausable, sedated, no able to 

participate in the interview.  I have collateral information from her  

Chandu Jordan.  He says that the patient has been in a lot of stress, 

overwhelmed and anxious in the last week.  He says that he has an strong 

suspicion that she overdosed with suicidal intentions.  He relates that they 

have been marital problems for months now, but in the last week does probably 

have exacerbated and few days ago he left the house and asked her for divorce.  

He clarifies that the patient has history of anxiety and depression and she is 

in psychotropics, but he doesn't know the name of the medicine and strength.\





1/19/2018: Today a psychiatric evaluation the patient is calm, cooperative, she 

seems to be objectively and subjectively depressed.  Tearful, she expresses 

that she regrets her recent actions.  She says that she has been extremely 

depressed, overwhelmed, frustrated with intrusive thoughts of harming herself.  

She says that she has been confronting serious problems with her .  In 

the last days they have been talking about  after 34 years of marriage 

"and this is not very easy for me, but the most horrible has been his last 

actions".  The patient also reports that she has been feeling hopeless, helpless

, worthless, and is sleeping very poorly.  The patient is now fully oriented 3

, without any fluctuation of consciousness, no attention deficit.  However, as 

per nurses, the patient has been described as disorganized, with periodic 

confusion and agitation, but not aggressive.





Review of Systems


Constitutional:  DENIES: Diaphoretic episodes, Fatigue, Fever, Weight gain, 

Weight loss, Chills, Dizziness, Change in appetite, Night Sweats


Endocrine:  DENIES: Abnorml menstrual pattern, Heat/cold intolerance, Polydipsia

, Polyuria, Polyphagia


Eyes:  DENIES: Blurred vision, Diplopia, Eye inflammation, Eye pain, Vision loss

, Photosensitivity, Double Vision


Ears, nose, mouth, throat:  DENIES: Tinnitus, Hearing loss, Vertigo, Nasal 

discharge, Oral lesions, Throat pain, Hoarseness, Ear Pain, Running Nose, 

Epistaxis, Sinus Pain, Toothache, Odynophagia


Respiratory:  DENIES: Apneas, Cough, Snoring, Wheezing, Hemoptysis, Sputum 

production, Shortness of breath


Cardiovascular:  DENIES: Chest pain, Palpitations, Syncope, Dyspnea on Exertion

, PND, Lower Extremity Edema, Orthopnea, Claudication


Gastrointestinal:  DENIES: Abdominal pain, Black stools, Bloody stools, 

Constipation, Diarrhea, Nausea, Vomiting, Difficulty Swallowing, Anorexia


Genitourinary:  DENIES: Abnormal vaginal bleeding, Dysmenorrhea, Dyspareunia, 

Sexual dysfunction, Urinary frequency, Urinary incontinence, Urgency, Hematuria

, Dysuria, Nocturia, Vaginal discharge


Musculoskeletal:  DENIES: Joint pain, Muscle aches, Stiffness, Joint Swelling, 

Back pain, Neck pain


Integumentary:  DENIES: Abnormal pigmentation, Pruritus, Rash, Nail changes, 

Breast masses, Breast skin changes, Nipple discharge


Hematologic/lymphatic:  DENIES: Bruising, Lymphadenopathy


Immunologic/allergic:  DENIES: Eczema, Urticaria


Neurologic:  DENIES: Abnormal gait, Headache, Localized weakness, Paresthesias, 

Seizures, Speech Problems, Tremor, Poor Balance


Psychiatric:  COMPLAINS OF: Mood changes, Depression, Suicidal Ideation, DENIES

: Anxiety, Confusion, Hallucinations, Agitation, Homicidal Ideation, Delusions





Substance Abuse History


Drugs/Alcohol past 12 months


Patient denies the use of alcohol and illicit drugs





Past Family Social History


Coded Allergies:  


     Sulfa (Sulfonamide Antibiotics) (Unverified  Allergy, Severe, 8/15/17)


Reported Medications


Simvastatin (Simvastatin) 20 Mg Tab, 20 MG PO HS


   7/10/13


Discontinued Reported Medications


Bupropion Hcl (Wellbutrin Sr) 100 Mg Tab, 100 MG PO DAILY, TAB


   4/25/16


Zolpidem Tartrate (Zolpidem Tartrate) 10 Mg Tab, 10 MG PO HS


   7/10/13


[Estrgn Methtest ]   No Conflict Check, PO DAILY


   7/10/13





Current Medications








 Medications


  (Trade)  Dose


 Ordered  Sig/Maverick


 Route  Start Time


 Stop Time Status Last Admin


 


  (Ativan)  1 mg  Q6H  PRN


 PO  1/19/18 12:00


     


 


 


  (Ativan Inj)  1 mg  Q6H  PRN


 IM  1/19/18 12:00


   UNV  


 


 


  (Ativan)  0.5 mg  Q12H  PRN


 PO  1/19/18 12:00


   UNV  


 


 


  (Ativan Inj)  0.5 mg  Q12H  PRN


 IM  1/19/18 12:00


   UNV  


 


 


  (Tylenol)  650 mg  Q4H  PRN


 PO  1/19/18 12:00


   UNV  


 


 


  (Milk Of


 Magnesia Liq)  30 ml  DAILY  PRN


 PO  1/19/18 12:00


   UNV  


 


 


  (Mag-Al Plus


 Susp Liq)  30 ml  Q6H  PRN


 PO  1/19/18 12:00


   UNV  


 


 


  (Habitrol 21 Mg


 Patch.24 Hr)  1 patch  DAILY


 T-DERMAL  1/19/18 12:00


   UNV  


 


 


  (Zoloft)  25 mg  DAILY


 PO  1/20/18 09:00


   UNV  


 








Social History


Patient was born and raised in [place], she lives in [place] with 

, is self employed, her highest level of education is has


Patient's Strengths (min. 2)


Family support





Mental Status Examination


Appearance:  Appropriate


Consciousness:  Alert


Orientation:  x4


Motor Activity:  Normal gait


Speech:  Unremarkable


Language:  Adequate


Fund of Knowledge:  Adequate


Attention and Concentration:  Adequate


Memory:  Unremarkable


Mood:  Sad


Affect:  Irritable, Sad


Thought Process & Associations:  Intact


Thought Content:  Appropriate


Hallucination Type:  None


Delusion Type:  None


Suicidal Ideation:  Yes


Suicidal Plan:  No


Suicidal Intention:  No


Homicidal Ideation:  No


Homicidal Plan:  No


Homicidal Intention:  No


Insight:  Poor


Judgment:  Poor





Assessment & Plan


Problem List:  


(1) Major depressive disorder, recurrent


ICD Codes:  F33.9 - Major depressive disorder, recurrent, unspecified


Assessment & Plan:  On psychiatric evaluation today the patient reports that 

she has been feeling very overwhelmed, frustrated, depressed in the context of 

ongoing problems with her .  She says that in the last week they have 

been talking about divorce she has become aware about several "misbehaviors of 

my  that are very difficult to swallow and tolerate".  She reports that 

in the last week she has not been sleeping, she has been having intrusive 

recurrent thoughts committing suicide to the point that she could not take it 

anymore and she overdosed in order to die.  At this moment the patient says 

that she regrets her action, but she admits that she needs help to straighten 

her thoughts.  Due to the level of risk the patient needs psychiatric admission 

for stabilization and safety.  I will start Zoloft 25 mg daily for depression.  

Clonazepam 0.5 mg twice a day.  Transfer patient to psychiatry once medically 

cleared.   intervention for psychosocial assessment, collateral 

information, individual and group therapies, to coordinate a safe discharge.





Assessment & Plan


Estimated LOS:  Guevara Rubio MD Jan 19, 2018 12:08

## 2018-01-19 NOTE — HHI.PR
Subjective


Remarks


Patient in nad. Says she feels better. She is more awake and alert. Per family 

almost at baseline. Patient denies any cp, sob n/v/d/c.





Objective


Vitals





Vital Signs








  Date Time  Temp Pulse Resp B/P (MAP) Pulse Ox O2 Delivery O2 Flow Rate FiO2


 


1/19/18 08:00 98.4 91 16 136/65 (88) 97   


 


1/19/18 06:14  97      


 


1/19/18 05:24 98.3 96 17 105/55 (72) 95   


 


1/19/18 02:58        21


 


1/19/18 01:43 99.1 103 18 105/53 (70) 94   


 


1/18/18 20:36 99.5 102 17 132/63 (86) 94   


 


1/18/18 18:30  93      


 


1/18/18 17:50 98.6 98 18 143/72 (95) 95   


 


1/18/18 15:25 98.1 89 18 144/68 (93) 100   


 


1/18/18 12:48  67 18 132/69 (90) 97 Room Air  


 


1/18/18 10:56     97   21


 


1/18/18 10:30  75 18 156/72 (100) 100 Room Air  














I/O      


 


 1/18/18 1/18/18 1/18/18 1/19/18 1/19/18 1/19/18





 07:00 15:00 23:00 07:00 15:00 23:00


 


Intake Total  1000 ml   100 ml 


 


Balance  1000 ml   100 ml 


 


      


 


Intake Oral     100 ml 


 


IV Total  1000 ml    


 


# Voids     3 








Result Diagram:  


1/19/18 0522 1/19/18 0522





Imaging





Last Impressions








Chest X-Ray 1/18/18 0729 Signed





Impressions: 





 Service Date/Time:  Thursday, January 18, 2018 07:50 - CONCLUSION:  Symmetric 





 biapical opacity likely representing pleural parenchymal scar. Otherwise, no 





 acute finding is identified.     Jerod Turpin MD 








Objective Remarks


GENERAL: This is a well-nourished, well-developed  female patient, 

appears in nad. More awake and alert


CARDIOVASCULAR: Regular rate and rhythm without murmurs, gallops, or rubs. 


RESPIRATORY: Poor effort.  Clear to auscultation. Breath sounds equal 

bilaterally. No wheezes, rales, or rhonchi.  


GASTROINTESTINAL: Abdomen soft, non-tender, nondistended. No hepato-splenomegaly

, or palpable masses. No guarding.


MUSCULOSKELETAL: Extremities without clubbing, cyanosis, or edema. No joint 

tenderness, effusion, or edema noted. No calf tenderness. 


NEUROLOGICAL: more awake and alert and oriented x4. Follows commands and 

answers appropriately all questions. CN 2-12 grossly normal. Motor and sensory 

intact. Normal speech.





A/P


Assessment and Plan


54yo female with PMHX significant for right subclavian steal syndrome, HTN, HLD 

and depression who presents to [facility] with AMS.  Patient was found by 

her  early this morning in her bed unarousable with benzodiazepines and 

opiates at her beside.





Altered mental status/metabolic encephalopathy


Possible intentional drug overdose


History of depression


   - UDS positive for opiates and benzodiazepine.  Neg for ethyl alcohol.


   - Psychiatry consulted by ED, appreciate recommendations


   - Avoid all sedating medications


   - Neuro checks


   - IVF


   - Monitor respiratory status


   - Baker act 


   - DC to inpatient psych 





Acute kidney injury, improving to be DC to med psych to continue IVF , 

encourage PO intake 


   - suspect secondary to dehydration/poor oral intake


   - Creatinine 1.34, GFR 41


   - Avoid nephrotoxic agent


   - IVF


   - Monitor kidney function





Leukocytosis


   - suspect reactive


   - patient is afebrile.  CXR reviewed by me, no acute cardiopulmonary process


   - monitor white count





Hypertension/Dyslipidemia


   - will resume home medications once med reconciliation updated





DVT prophylaxis


   - Lovenox subcutaneous





Discussed Condition With


Patient, family 





Improving, pt almost at baseline. DC to inpatient psych


Discharge Planning


DC to inpatient psych in stable condition to follow up with PCP and consultants


Meds per med reconciliations


Activity ad edgar as tolerated


Diet healthy heart 





DC time > 30 min











Annette Sesay MD Jan 19, 2018 09:41

## 2018-01-20 VITALS
RESPIRATION RATE: 18 BRPM | OXYGEN SATURATION: 95 % | DIASTOLIC BLOOD PRESSURE: 62 MMHG | TEMPERATURE: 99.3 F | HEART RATE: 96 BPM | SYSTOLIC BLOOD PRESSURE: 127 MMHG

## 2018-01-20 VITALS
OXYGEN SATURATION: 95 % | RESPIRATION RATE: 18 BRPM | HEART RATE: 109 BPM | DIASTOLIC BLOOD PRESSURE: 64 MMHG | TEMPERATURE: 98 F | SYSTOLIC BLOOD PRESSURE: 130 MMHG

## 2018-01-20 LAB
BUN SERPL-MCNC: 17 MG/DL (ref 7–18)
CALCIUM SERPL-MCNC: 9.3 MG/DL (ref 8.5–10.1)
CHLORIDE SERPL-SCNC: 107 MEQ/L (ref 98–107)
CHOLEST SERPL-MCNC: 139 MG/DL (ref 120–200)
CHOLESTEROL/ HDL RATIO: 2.17 RATIO
CREAT SERPL-MCNC: 1.48 MG/DL (ref 0.5–1)
GFR SERPLBLD BASED ON 1.73 SQ M-ARVRAT: 37 ML/MIN (ref 89–?)
GLUCOSE SERPL-MCNC: 117 MG/DL (ref 74–106)
HBA1C MFR BLD: 5.9 % (ref 4.3–6)
HCO3 BLD-SCNC: 23.9 MEQ/L (ref 21–32)
HDLC SERPL-MCNC: 63.9 MG/DL (ref 40–60)
LDLC SERPL-MCNC: 50 MG/DL (ref 0–99)
MAGNESIUM SERPL-MCNC: 2 MG/DL (ref 1.5–2.5)
SODIUM SERPL-SCNC: 142 MEQ/L (ref 136–145)
TRIGL SERPL-MCNC: 124 MG/DL (ref 42–150)

## 2018-01-20 RX ADMIN — PRAVASTATIN SODIUM SCH MG: 40 TABLET ORAL at 21:00

## 2018-01-20 RX ADMIN — ACETAMINOPHEN PRN MG: 325 TABLET ORAL at 17:21

## 2018-01-20 RX ADMIN — NICOTINE SCH PATCH: 21 PATCH, EXTENDED RELEASE TOPICAL at 09:00

## 2018-01-20 RX ADMIN — SERTRALINE HYDROCHLORIDE SCH MG: 50 TABLET, FILM COATED ORAL at 09:00

## 2018-01-20 RX ADMIN — SERTRALINE HYDROCHLORIDE SCH MG: 50 TABLET, FILM COATED ORAL at 09:24

## 2018-01-20 NOTE — HHI.PYPN
Subjective


Remarks


Patient was seen and case discussed with nursing.  This is a request for second 

opinion.  Admission note was reviewed.  Labwork was reviewed and potassium is 

low and creatinine is elevated.  Patient is admitted for suicide attempt 

secondary to various life stressors.  Patient describes a story of 's 

infidelity sending naked pictures of himself to various woman.  Patient says 

she had a large benefit from Prozac in the past and prefers this medication 

Zoloft.  Today she is quite anxious, tearful and depressed.  However she denies 

suicidal homicidal ideation intent or plan





Mental Status Examination


Appearance:  Appropriate


Consciousness:  Alert


Orientation:  x4


Motor Activity:  Normal gait


Speech:  Unremarkable


Language:  Adequate


Fund of Knowledge:  Adequate


Attention and Concentration:  Adequate


Memory:  Unremarkable


Mood:  Sad


Affect:  Irritable, Sad, Anxious


Thought Process & Associations:  Intact


Thought Content:  Appropriate


Hallucination Type:  None


Delusion Type:  None


Suicidal Ideation:  Yes


Suicidal Plan:  No


Suicidal Intention:  No


Homicidal Ideation:  No


Homicidal Plan:  No


Homicidal Intention:  No


Insight:  Poor


Judgment:  Poor





Results


Labs











Test


  1/20/18


08:55


 


Blood Urea Nitrogen 17 MG/DL 


 


Creatinine 1.48 MG/DL 


 


Random Glucose 117 MG/DL 


 


Calcium Level 9.3 MG/DL 


 


Sodium Level 142 MEQ/L 


 


Potassium Level 3.1 MEQ/L 


 


Chloride Level 107 MEQ/L 


 


Carbon Dioxide Level 23.9 MEQ/L 


 


Anion Gap 11 MEQ/L 


 


Estimat Glomerular Filtration


Rate 37 ML/MIN 


 


 


Triglycerides Level 124 MG/DL 


 


Cholesterol Level 139 MG/DL 


 


LDL Cholesterol 50 MG/DL 


 


HDL Cholesterol 63.9 MG/DL 


 


Cholesterol/HDL Ratio 2.17 RATIO 








Vitals/IOs





Vital Signs








  Date Time  Temp Pulse Resp B/P (MAP) Pulse Ox O2 Delivery O2 Flow Rate FiO2


 


1/20/18 05:56 99.3 96 18 127/62 (83) 95   











Assessment & Plan


Problem List:  


(1) Major depressive disorder, recurrent


ICD Codes:  F33.9 - Major depressive disorder, recurrent, unspecified


Assessment & Plan


We will replace potassium 30 mEq and recheck in a couple hours.  Check 

magnesium level, consult medicine.  DC Zoloft and start Prozac 20 mg.  

Trazodone 50 mg by mouth daily at bedtime.  I agree with the first opinion to 

continue petition.  Criteria include suicide attempt


Justification for Cont. Inpt.


Patient would decompensate in a less restrictive setting











Fede Jones DO Jan 20, 2018 11:15

## 2018-01-20 NOTE — PD.CONS
HPI


Service


[facility]


Consult Requested By


Psychiatric services


Reason for Consult


Medical management


Primary Care Physician


Isatu Ramos MD


Diagnoses:  


History of Present Illness


Written by Katelyn Carpio, acting as scribe for Dr. Sesay on 1/20/18 at 14:

27. 





This is a 52yo female with PMHX significant for right subclavian steal syndrome

, HTN, HLD and depression who was admitted to [facility] on 1/18/18 with 

AMS after being found by her  unarousable with benzodiazepines and 

opiates at her beside due to suspected intentional overdose and has since been 

admitted to the inpatient psychiatric unit.  Hospitalist services have been 

consulted for medical management.  Patient seen and examined.  She states 

yesterday she was unable to walk at all but has no problems today.  She 

complains of bilateral lower abdominal pain that she attributes to her 

hysterectomy she had years ago.





Review of Systems


Except as stated in HPI:  all other systems reviewed are Neg





Past Family Social History


Allergies:  


Coded Allergies:  


     Sulfa (Sulfonamide Antibiotics) (Unverified  Allergy, Severe, 8/15/17)


Past Medical History


HTN


HLD


Right subclavian steal syndrome


Depression


Past Surgical History


Breast augmentation


Hysterectomy


Angioplasty right subclavian 2013


Reported Medications


Wellbutrin Sr (Bupropion HCl) 100 Mg Tab 100 Mg PO DAILY 


Simvastatin 20 Mg Tab 20 Mg PO HS 


Zolpidem Tartrate 10 Mg Tab 10 Mg PO HS 


[Estrgn Methtest ]    PO DAILY


Active Ordered Medications





Current Medications








 Medications


  (Trade)  Dose


 Ordered  Sig/Maverick


 Route  Start Time


 Stop Time Status Last Admin


 


  (Ativan)  1 mg  Q6H  PRN


 PO  1/19/18 12:00


    1/20/18 11:03


 


 


  (Ativan Inj)  1 mg  Q6H  PRN


 IM  1/19/18 12:00


     


 


 


  (Tylenol)  650 mg  Q4H  PRN


 PO  1/19/18 12:00


     


 


 


  (Milk Of


 Magnesia Liq)  30 ml  DAILY  PRN


 PO  1/19/18 12:00


     


 


 


  (Mag-Al Plus


 Susp Liq)  30 ml  Q6H  PRN


 PO  1/19/18 12:00


     


 


 


  (Habitrol 21 Mg


 Patch.24 Hr)  1 patch  DAILY


 T-DERMAL  1/19/18 12:00


     


 


 


  (Pravachol)  40 mg  HS


 PO  1/19/18 21:00


    1/19/18 21:28


 


 


 Miscellaneous


 Information  1  HS


 T-DERMAL  1/19/18 21:00


     


 


 


  (Pill Splitter)  1 ea  UNSCH  PRN


 OTHER  1/19/18 12:15


     


 


 


  (PROzac)  20 mg  DAILY


 PO  1/20/18 11:15


    1/20/18 12:03


 








Family History


DM


HTN


CAD


Mother, multiple CVAs


Father, lung cancer








Social History


Patient denies any tobacco use.  Denies any alcohol use or illicit drug use.





Physical Exam


Vital Signs





Vital Signs








  Date Time  Temp Pulse Resp B/P (MAP) Pulse Ox O2 Delivery O2 Flow Rate FiO2


 


1/20/18 05:56 99.3 96 18 127/62 (83) 95   


 


1/19/18 18:17 99.4 105 18 146/74 (98) 96   


 


1/19/18 14:31 97.9 106 17 143/63 (89) 98   








Physical Exam


GENERAL: This is a well-nourished, well-developed  female patient, in 

no apparent distress.  Awake and alert.


SKIN: No rashes, ecchymoses or lesions. Cool and dry.


HEAD: Atraumatic. Normocephalic. No temporal or scalp tenderness.


EYES: Pupils equal round and reactive. Extraocular motions intact. No scleral 

icterus. No injection or drainage. 


ENT: Nose without bleeding or purulent drainage. Throat without erythema, 

tonsillar hypertrophy or exudate. Uvula midline. Airway patent.


NECK: Trachea midline. No lymphadenopathy. Supple, nontender, no meningeal 

signs.


CARDIOVASCULAR: Regular rate and rhythm without murmurs, gallops, or rubs. 


RESPIRATORY: Clear to auscultation. Breath sounds equal bilaterally. No wheezes

, rales, or rhonchi.  


GASTROINTESTINAL: Abdomen soft, non-tender, nondistended. No hepato-splenomegaly

, or palpable masses. No guarding.


MUSCULOSKELETAL: Extremities without clubbing, cyanosis, or edema. No joint 

tenderness, effusion, or edema noted. No calf tenderness.


NEUROLOGICAL: Awake and alert. Cranial nerves II through XII grossly intact.  

Motor and sensory grossly within normal limits. Five out of 5 muscle strength 

in all muscle groups.  Normal speech.


Laboratory





Laboratory Tests








Test


  1/20/18


08:55


 


Blood Urea Nitrogen 17 


 


Creatinine 1.48 


 


Random Glucose 117 


 


Calcium Level 9.3 


 


Sodium Level 142 


 


Potassium Level 3.1 


 


Chloride Level 107 


 


Carbon Dioxide Level 23.9 


 


Anion Gap 11 


 


Estimat Glomerular Filtration


Rate 37 


 


 


Hemoglobin A1c 5.9 


 


Triglycerides Level 124 


 


Cholesterol Level 139 


 


LDL Cholesterol 50 


 


HDL Cholesterol 63.9 


 


Cholesterol/HDL Ratio 2.17 








Result Diagram:  


1/20/18 0855








Assessment and Plan


Assessment and Plan


52yo female with PMHX significant for right subclavian steal syndrome, HTN, HLD 

and depression who was admitted to [facility] on 1/18/18 with AMS after 

being found by her  unarousable with benzodiazepines and opiates at her 

beside due to suspected intentional overdose and has since been admitted to the 

inpatient psychiatric unit.





Depression 


Suicidal ideation


Intentional overdose


   - Management per psychiatric team





Lower abdominal pain


   - suspect somatization


   - obtain UA


   - monitor





LUDIN


   - suspect due to hypovolemia


   - encourage po intake


   - avoid nephrotoxic agents


   - continue to monitor kidney function





Hypokalemia


   - po K repletion


   - am labs to monitor response





Hypertension


   - controlled off of antihypertensives 


   - continue to monitor BP and adjust treatment accordingly





Dyslipidemia


   - continue statin therapy





DVT prophylaxis


   - patient is ambulatory





Thank you kindly for this consultation.  Will continue to follow patient along 

with you.





This note was transcribed by ERASMO Ha .  I, Dr. Annette Sesay 

personally performed the history, physical exam, and medical decision making; 

and confirmed the accuracy of the information in the transcribed note.





Authenticated by Dr. Annette Sesay on 1/20/18 at 14:27.


Discussed Condition With


patient, nursing staff











Katelyn Carpio Jan 20, 2018 14:28


Annette Sesay MD Jan 20, 2018 16:53

## 2018-01-21 VITALS
DIASTOLIC BLOOD PRESSURE: 90 MMHG | TEMPERATURE: 99 F | SYSTOLIC BLOOD PRESSURE: 152 MMHG | HEART RATE: 110 BPM | RESPIRATION RATE: 18 BRPM | OXYGEN SATURATION: 94 %

## 2018-01-21 VITALS
RESPIRATION RATE: 16 BRPM | TEMPERATURE: 99.8 F | OXYGEN SATURATION: 94 % | HEART RATE: 115 BPM | SYSTOLIC BLOOD PRESSURE: 118 MMHG | DIASTOLIC BLOOD PRESSURE: 59 MMHG

## 2018-01-21 RX ADMIN — NICOTINE SCH PATCH: 21 PATCH, EXTENDED RELEASE TOPICAL at 09:00

## 2018-01-21 RX ADMIN — PRAVASTATIN SODIUM SCH MG: 40 TABLET ORAL at 20:47

## 2018-01-21 NOTE — HHI.PYPN
Subjective


Remarks


Patient was seen and case discussed with nursing.  Patient remains at a 

crossroads about what to do to various social stressors.  Her  is been 

sending and receiving nude pictures and they have a company together.  She 

denies suicidal or homicidal ideation intent or plan.  Complaining of insomnia





Mental Status Examination


Appearance:  Appropriate


Consciousness:  Alert


Orientation:  x4


Motor Activity:  Normal gait


Speech:  Unremarkable


Language:  Adequate


Fund of Knowledge:  Adequate


Attention and Concentration:  Adequate


Memory:  Unremarkable


Mood:  Sad


Affect:  Irritable, Sad, Anxious


Thought Process & Associations:  Intact


Thought Content:  Appropriate


Hallucination Type:  None


Delusion Type:  None


Suicidal Ideation:  Yes


Suicidal Plan:  No


Suicidal Intention:  No


Homicidal Ideation:  No


Homicidal Plan:  No


Homicidal Intention:  No


Insight:  Poor


Judgment:  Poor





Results


Labs











Test


  1/20/18


21:05


 


Potassium Level 3.8 MEQ/L 


 


Magnesium Level 2.0 MG/DL 








Vitals/IOs





Vital Signs








  Date Time  Temp Pulse Resp B/P (MAP) Pulse Ox O2 Delivery O2 Flow Rate FiO2


 


1/21/18 06:00 99.8 115 16 118/59 (71) 94   











Assessment & Plan


Problem List:  


(1) Major depressive disorder, recurrent


ICD Codes:  F33.9 - Major depressive disorder, recurrent, unspecified


Assessment & Plan


Add trazodone 50 mg by mouth daily at bedtime


Justification for Cont. Inpt.


Patient will decompensate in a less restrictive setting











Fede Jones DO Jan 21, 2018 11:58

## 2018-01-21 NOTE — HHI.PR
Subjective


Remarks


NOT SEEN





Objective


Vitals





Vital Signs








  Date Time  Temp Pulse Resp B/P (MAP) Pulse Ox O2 Delivery O2 Flow Rate FiO2


 


1/21/18 18:23 99.0 110 18 152/90 (110) 94   


 


1/21/18 06:00 99.8 115 16 118/59 (78) 94   














I/O      


 


 1/20/18 1/20/18 1/20/18 1/21/18 1/21/18 1/21/18





 07:00 15:00 23:00 07:00 15:00 23:00


 


Intake Total      240 ml


 


Balance      240 ml


 


      


 


Intake Oral      240 ml








Result Diagram:  


1/20/18 2105





Objective Remarks


GENERAL: This is a well-nourished, well-developed  female patient, in 

no apparent distress.  Awake and alert.


SKIN: No rashes, ecchymoses or lesions. Cool and dry.


HEAD: Atraumatic. Normocephalic. No temporal or scalp tenderness.


EYES: Pupils equal round and reactive. Extraocular motions intact. No scleral 

icterus. No injection or drainage. 


ENT: Nose without bleeding or purulent drainage. Throat without erythema, 

tonsillar hypertrophy or exudate. Uvula midline. Airway patent.


NECK: Trachea midline. No lymphadenopathy. Supple, nontender, no meningeal 

signs.


CARDIOVASCULAR: Regular rate and rhythm without murmurs, gallops, or rubs. 


RESPIRATORY: Clear to auscultation. Breath sounds equal bilaterally. No wheezes

, rales, or rhonchi.  


GASTROINTESTINAL: Abdomen soft, non-tender, nondistended. No hepato-splenomegaly

, or palpable masses. No guarding.


MUSCULOSKELETAL: Extremities without clubbing, cyanosis, or edema. No joint 

tenderness, effusion, or edema noted. No calf tenderness.


NEUROLOGICAL: Awake and alert. Cranial nerves II through XII grossly intact.  

Motor and sensory grossly within normal limits. Five out of 5 muscle strength 

in all muscle groups.  Normal speech.





A/P


Assessment and Plan


52yo female with PMHX significant for right subclavian steal syndrome, HTN, HLD 

and depression who was admitted to [facility] on 1/18/18 with AMS after 

being found by her  unarousable with benzodiazepines and opiates at her 

beside due to suspected intentional overdose and has since been admitted to the 

inpatient psychiatric unit.





Depression 


Suicidal ideation


Intentional overdose


   - Management per psychiatric team





Lower abdominal pain


   - suspect somatization


   - obtain UA


   - monitor





LUDIN/CKD stage 3


   - suspect due to hypovolemia


   - encourage po intake


   - avoid nephrotoxic agents


   - continue to monitor kidney function





Hypokalemia


   - po K repletion


   - rpt labs to monitor response





Hypertension


   - off antihypertensives 


   - continue to monitor BP and adjust treatment accordingly





Dyslipidemia


   - continue statin therapy





DVT prophylaxis


   - patient is ambulatory











Benny Couch MD Jan 21, 2018 22:25

## 2018-01-22 VITALS — DIASTOLIC BLOOD PRESSURE: 60 MMHG | HEART RATE: 97 BPM | TEMPERATURE: 99.7 F | SYSTOLIC BLOOD PRESSURE: 119 MMHG

## 2018-01-22 VITALS
DIASTOLIC BLOOD PRESSURE: 57 MMHG | SYSTOLIC BLOOD PRESSURE: 115 MMHG | OXYGEN SATURATION: 98 % | HEART RATE: 112 BPM | TEMPERATURE: 102 F | RESPIRATION RATE: 18 BRPM

## 2018-01-22 VITALS
SYSTOLIC BLOOD PRESSURE: 124 MMHG | DIASTOLIC BLOOD PRESSURE: 58 MMHG | OXYGEN SATURATION: 95 % | RESPIRATION RATE: 18 BRPM | HEART RATE: 104 BPM | TEMPERATURE: 100 F

## 2018-01-22 VITALS — TEMPERATURE: 100.6 F

## 2018-01-22 LAB
BACTERIA #/AREA URNS HPF: (no result) /HPF
BASOPHILS # BLD AUTO: 0.1 TH/MM3 (ref 0–0.2)
BASOPHILS NFR BLD: 0.5 % (ref 0–2)
BUN SERPL-MCNC: 22 MG/DL (ref 7–18)
CALCIUM SERPL-MCNC: 8.8 MG/DL (ref 8.5–10.1)
CHLORIDE SERPL-SCNC: 105 MEQ/L (ref 98–107)
COLOR UR: YELLOW
CREAT SERPL-MCNC: 1.21 MG/DL (ref 0.5–1)
EOSINOPHIL # BLD: 0 TH/MM3 (ref 0–0.4)
EOSINOPHIL NFR BLD: 0.4 % (ref 0–4)
ERYTHROCYTE [DISTWIDTH] IN BLOOD BY AUTOMATED COUNT: 13.2 % (ref 11.6–17.2)
GFR SERPLBLD BASED ON 1.73 SQ M-ARVRAT: 47 ML/MIN (ref 89–?)
GLUCOSE SERPL-MCNC: 94 MG/DL (ref 74–106)
GLUCOSE UR STRIP-MCNC: (no result) MG/DL
HCO3 BLD-SCNC: 25.8 MEQ/L (ref 21–32)
HCT VFR BLD CALC: 34.3 % (ref 35–46)
HGB BLD-MCNC: 11.6 GM/DL (ref 11.6–15.3)
HGB UR QL STRIP: (no result)
KETONES UR STRIP-MCNC: (no result) MG/DL
LYMPHOCYTES # BLD AUTO: 1.9 TH/MM3 (ref 1–4.8)
LYMPHOCYTES NFR BLD AUTO: 16.8 % (ref 9–44)
MAGNESIUM SERPL-MCNC: 2 MG/DL (ref 1.5–2.5)
MCH RBC QN AUTO: 31.3 PG (ref 27–34)
MCHC RBC AUTO-ENTMCNC: 33.7 % (ref 32–36)
MCV RBC AUTO: 92.9 FL (ref 80–100)
MONOCYTE #: 1.1 TH/MM3 (ref 0–0.9)
MONOCYTES NFR BLD: 9.5 % (ref 0–8)
MUCOUS THREADS #/AREA URNS LPF: (no result) /LPF
NEUTROPHILS # BLD AUTO: 8.3 TH/MM3 (ref 1.8–7.7)
NEUTROPHILS NFR BLD AUTO: 72.8 % (ref 16–70)
NITRITE UR QL STRIP: (no result)
PLATELET # BLD: 269 TH/MM3 (ref 150–450)
PMV BLD AUTO: 8.8 FL (ref 7–11)
RBC # BLD AUTO: 3.69 MIL/MM3 (ref 4–5.3)
SODIUM SERPL-SCNC: 139 MEQ/L (ref 136–145)
SP GR UR STRIP: 1.03 (ref 1–1.03)
SQUAMOUS #/AREA URNS HPF: 10 /HPF (ref 0–5)
URINE LEUKOCYTE ESTERASE: (no result)
WBC # BLD AUTO: 11.4 TH/MM3 (ref 4–11)

## 2018-01-22 RX ADMIN — NICOTINE SCH PATCH: 21 PATCH, EXTENDED RELEASE TOPICAL at 09:00

## 2018-01-22 RX ADMIN — PRAVASTATIN SODIUM SCH MG: 40 TABLET ORAL at 20:40

## 2018-01-22 RX ADMIN — CEFUROXIME AXETIL SCH MG: 500 TABLET ORAL at 21:00

## 2018-01-22 RX ADMIN — ACETAMINOPHEN PRN MG: 325 TABLET ORAL at 07:38

## 2018-01-22 RX ADMIN — ACETAMINOPHEN PRN MG: 325 TABLET ORAL at 17:16

## 2018-01-22 NOTE — HHI.PR
Subjective


Remarks


Follow fever.  MAXIMUM TEMPERATURE 102.  Complaining of chills, productive 

cough of white phlegm and shortness of breath.  Discussed with nursing staff





Objective


Vitals





Vital Signs








  Date Time  Temp Pulse Resp B/P (MAP) Pulse Ox O2 Delivery O2 Flow Rate FiO2


 


1/22/18 10:17 99.7 97  119/60 (79)    


 


1/22/18 07:35 100.6       


 


1/22/18 06:01 102.0 112 18 115/57 (76) 98   


 


1/21/18 18:23 99.0 110 18 152/90 (110) 94   














I/O      


 


 1/21/18 1/21/18 1/21/18 1/22/18 1/22/18 1/22/18





 07:00 15:00 23:00 07:00 15:00 23:00


 


Intake Total   240 ml  240 ml 


 


Balance   240 ml  240 ml 


 


      


 


Intake Oral   240 ml  240 ml 








Result Diagram:  


1/20/18 2105





Imaging





Objective Remarks


GENERAL: This is a well-nourished, well-developed  female patient, in 

no apparent distress.  Awake and alert.


SKIN: No rashes, ecchymoses or lesions. Cool and dry.


HEAD: Atraumatic. Normocephalic. No temporal or scalp tenderness.


EYES: Pupils equal round and reactive. Extraocular motions intact. No scleral 

icterus. No injection or drainage. 


ENT: Nose without bleeding or purulent drainage. Throat without erythema, 

tonsillar hypertrophy or exudate. Uvula midline. Airway patent.


NECK: Trachea midline. No lymphadenopathy. Supple, nontender, no meningeal 

signs.


CARDIOVASCULAR: Regular rate and rhythm without murmurs, gallops, or rubs. 


RESPIRATORY: Coarse Breath sounds equal bilaterally. No wheezes, rales, or 

rhonchi.  


GASTROINTESTINAL: Abdomen soft, non-tender, nondistended.  No guarding.


MUSCULOSKELETAL: Extremities without clubbing, cyanosis, or edema. No joint 

tenderness, effusion, or edema noted. No calf tenderness.


NEUROLOGICAL: Awake and alert. Cranial nerves II through XII grossly intact.  

Motor and sensory grossly within normal limits. Five out of 5 muscle strength 

in all muscle groups.  Normal speech.





A/P


Assessment and Plan


54yo female with PMHX significant for right subclavian steal syndrome, HTN, HLD 

and depression who was admitted to [facility] on 1/18/18 with AMS after 

being found by her  unarousable with benzodiazepines and opiates at her 

beside due to suspected intentional overdose and has since been admitted to the 

inpatient psychiatric unit.





Fever.  Obtain CBC, BMP, chest x-ray, sputum culture, nasal wash for flu and 

blood cultures 





Depression 


Suicidal ideation


Intentional overdose


   - Management per psychiatric team





Lower abdominal pain


   - suspect somatization


   - obtain UA, discussed with RN


   - monitor





LUDIN/CKD stage 3


   - suspect due to hypovolemia


   - encourage po intake


   - avoid nephrotoxic agents


   - continue to monitor kidney function





Hypokalemia


   - po K repletion


   - rpt labs to monitor response





Hypertension


   - off antihypertensives 


   - continue to monitor BP and adjust treatment accordingly





Dyslipidemia


   - continue statin therapy





DVT prophylaxis


   - patient is ambulatory











Benny Couch MD Jan 22, 2018 14:09

## 2018-01-22 NOTE — HHI.PYPN
Subjective


Remarks


Especially was seen today for psychiatric reevaluation.  Chart was reviewed.  

Notes from weekend psychiatrist also reviewed.  The case was discussed with her 

counselor.  On psychiatric evaluation today the patient continues to be very 

concerned about her  behavior.  Patient doesn't have a plan at this 

moment to be safe in the community.  She says that she is overwhelmed, 

frustrated, she says that she would love to have a meeting with staff and her 

.  She denies suicidal and homicidal ideation at this moment, she denies 

visual and auditory hallucinations.  The patient is fully oriented 3.  

Compliant with medications, no severe side effects.





Review of Systems


Psychiatric:  COMPLAINS OF: Depression


Except as stated in HPI:  all other systems reviewed are Neg





Mental Status Examination


Appearance:  Appropriate


Consciousness:  Alert


Orientation:  x4


Motor Activity:  Normal gait


Speech:  Unremarkable


Language:  Adequate


Fund of Knowledge:  Adequate


Attention and Concentration:  Adequate


Memory:  Unremarkable


Mood:  Sad


Affect:  Irritable, Sad, Anxious


Thought Process & Associations:  Intact


Thought Content:  Appropriate


Hallucination Type:  None


Delusion Type:  None


Suicidal Ideation:  Yes


Suicidal Plan:  No


Suicidal Intention:  No


Homicidal Ideation:  No


Homicidal Plan:  No


Homicidal Intention:  No


Insight:  Poor


Judgment:  Poor





Results


Vitals/IOs





Vital Signs








  Date Time  Temp Pulse Resp B/P (MAP) Pulse Ox O2 Delivery O2 Flow Rate FiO2


 


1/22/18 10:17 99.7 97  119/60 (79)    


 


1/22/18 06:01   18  98   











Assessment & Plan


Problem List:  


(1) Major depressive disorder, recurrent


ICD Codes:  F33.9 - Major depressive disorder, recurrent, unspecified


Assessment & Plan:  Patient continues to show depressive symptoms, intrusive 

thoughts about recent marital conflict, continue current psychotropic regimen.





Assessment & Plan


Estimated LOS:  days


Justification for Cont. Inpt.


Patient has an elevated risk to decompensate at a lower level of care.











Guevara Estrada MD Jan 22, 2018 11:09

## 2018-01-22 NOTE — RADRPT
EXAM DATE/TIME:  01/22/2018 14:01 

 

[facility] COMPARISON:     

CHEST SINGLE AP, January 18, 2018, 7:50.

 

                     

INDICATIONS :     

Pneumonia.  Shortness of breath. Congestion.

                     

 

MEDICAL HISTORY :     

None.          

 

SURGICAL HISTORY :     

None.   

 

ENCOUNTER:     

Initial                                        

 

ACUITY:     

1 day      

 

PAIN SCORE:     

8/10

 

LOCATION:       

Low back.

 

FINDINGS:     

A single portable frontal view the chest shows bibasilar infiltrates which are new from the prior exa
m. No effusions. Heart is normal in size. Scoliotic curvature to the spine.

 

CONCLUSION:     Bibasilar infiltrates.

 

 

 

 Tk Phillips Jr., MD on January 22, 2018 at 14:38           

Board Certified Radiologist.

 This report was verified electronically.

## 2018-01-23 VITALS — HEART RATE: 93 BPM | OXYGEN SATURATION: 94 % | RESPIRATION RATE: 18 BRPM | TEMPERATURE: 99 F

## 2018-01-23 VITALS — TEMPERATURE: 101.6 F

## 2018-01-23 VITALS
SYSTOLIC BLOOD PRESSURE: 117 MMHG | RESPIRATION RATE: 18 BRPM | DIASTOLIC BLOOD PRESSURE: 55 MMHG | HEART RATE: 114 BPM | TEMPERATURE: 102.8 F | OXYGEN SATURATION: 93 %

## 2018-01-23 RX ADMIN — ACETAMINOPHEN PRN MG: 325 TABLET ORAL at 05:29

## 2018-01-23 RX ADMIN — NICOTINE SCH PATCH: 21 PATCH, EXTENDED RELEASE TOPICAL at 07:15

## 2018-01-23 RX ADMIN — CEFUROXIME AXETIL SCH MG: 500 TABLET ORAL at 09:00

## 2018-01-23 NOTE — HHI.PR
Subjective


Remarks


Follow-up fever.  She is feeling much better temperature down to 99 and wants 

to go home.  Improving cough and no shortness of breath.  No UTI symptoms.  

Patient aware urine and blood cultures are pending.  States she will follow-up 

with her primary care physician by the end of the week.  Discussed with RN and 

psychiatry





Objective


Vitals





Vital Signs








  Date Time  Temp Pulse Resp B/P (MAP) Pulse Ox O2 Delivery O2 Flow Rate FiO2


 


1/23/18 13:41 99.0 93 18  94   


 


1/23/18 06:45 101.6       


 


1/23/18 05:50 102.8 114 18 117/55 (75) 93   


 


1/22/18 18:13 100.0 104 18 124/58 (80) 95   














I/O      


 


 1/22/18 1/22/18 1/22/18 1/23/18 1/23/18 1/23/18





 07:00 15:00 23:00 07:00 15:00 23:00


 


Intake Total  240 ml    


 


Balance  240 ml    


 


      


 


Intake Oral  240 ml    








Result Diagram:  


1/22/18 2150 1/22/18 2150





Imaging





Last Impressions








Chest X-Ray 1/22/18 0000 Signed





Impressions: 





 Service Date/Time:  Monday, January 22, 2018 14:01 - CONCLUSION: Bibasilar 





 infiltrates.     Tk Phillips Jr., MD 








Objective Remarks


GENERAL: This is a well-nourished, well-developed  female patient, in 

no apparent distress.  Awake and alert.


SKIN: No rashes, ecchymoses or lesions. Cool and dry.


CARDIOVASCULAR: Regular rate and rhythm without murmurs, gallops, or rubs. 


RESPIRATORY: Coarse Breath sounds equal bilaterally. No wheezes, rales, or 

rhonchi.  


GASTROINTESTINAL: Abdomen soft, non-tender, nondistended.  No guarding.


MUSCULOSKELETAL: Extremities without clubbing, cyanosis, or edema. No joint 

tenderness, effusion, or edema noted. No calf tenderness.  No CVA tenderness


NEUROLOGICAL: Awake and alert. Cranial nerves II through XII grossly intact.  

Motor and sensory grossly within normal limits. Five out of 5 muscle strength 

in all muscle groups.  Normal speech.





A/P


Assessment and Plan


52yo female with PMHX significant for right subclavian steal syndrome, HTN, HLD 

and depression who was admitted to [facility] on 1/18/18 with AMS after 

being found by her  unarousable with benzodiazepines and opiates at her 

beside due to suspected intentional overdose and has since been admitted to the 

inpatient psychiatric unit.





Fever with gram-negative ananda UTI and abnormal chest x-ray with right basilar 

infiltrates.  BMP within normal limits.  Negative for flu.  Improving 

clinically stable.  Continue Ceftin and Zithromax and follow up urine and blood 

cultures.  Repeat chest x-ray in 6 weeks





Depression 


Suicidal ideation


Intentional overdose


   - Management per psychiatric team








LUDIN/CKD stage 3.  Improving


   - suspect due to hypovolemia


   - encourage po intake


   - avoid nephrotoxic agents


   - continue to monitor kidney function





Hypokalemia with improving


   - po K repletion


   - rpt labs to monitor response





Hypertension


   - off antihypertensives 


   - continue to monitor BP and adjust treatment accordingly





Dyslipidemia


   - continue statin therapy





DVT prophylaxis


   - patient is ambulatory











Benny Couch MD Jan 23, 2018 14:19

## 2018-01-23 NOTE — HHI.DS
Psychiatry Discharge Summary


Inpatient Psychiatric care?:  Yes


Advance Directive:  No


Reason Not Provided:  Due to Patient Condition


Mental Health AdvanceDirective:  No


Health Care Proxy:  Yes


Admission


Admission Date


Jan 19, 2018 at 11:41


Admission Diagnosis:  


(1) Major depressive disorder, recurrent


ICD Code:  F33.9 - Major depressive disorder, recurrent, unspecified


Brief History


1/18/2018 The patient is a 53 year-old woman dom[place]  [place], self employed, with psychiatry history of depression and anxiety , no 

hospitalizations, no SA, she is in Wellbutrin 100 mg bid prescribed by PCP,  

medical history of right subclavian steal syndrome, HTN, HLD,  who presents to 

[facility] with AMS.  Patient was found by her  early this morning 

in her bed unarousable with benzodiazepines and opiates at her beside.  EMS was 

called to the house and patient had GCS score of 8.  Her  is at the 

bedside and due to the patients cognitive impairment, all history is obtained 

from discussion with the , ED physician and review of the medical 

record.  The  states they have been having some martial difficulties and 

he has not been staying at the house but still goes to check on her.  He went 

in to check on her this morning and was not able to get her to wake up.  He 

denies that she drinks any alcohol or uses any illicit drugs.  In the ED, 

patients GCS was around 12.  Her UDS positive for opiates and benzodiazepines. 

On psychiatric evaluation patient is non arausable, sedated, no able to 

participate in the interview.  I have collateral information from her  

Chandu Jordan.  He says that the patient has been in a lot of stress, 

overwhelmed and anxious in the last week.  He says that he has an strong 

suspicion that she overdosed with suicidal intentions.  He relates that they 

have been marital problems for months now, but in the last week does probably 

have exacerbated and few days ago he left the house and asked her for divorce.  

He clarifies that the patient has history of anxiety and depression and she is 

in psychotropics, but he doesn't know the name of the medicine and strength.\





1/19/2018: Today a psychiatric evaluation the patient is calm, cooperative, she 

seems to be objectively and subjectively depressed.  Tearful, she expresses 

that she regrets her recent actions.  She says that she has been extremely 

depressed, overwhelmed, frustrated with intrusive thoughts of harming herself.  

She says that she has been confronting serious problems with her .  In 

the last days they have been talking about  after 34 years of marriage 

"and this is not very easy for me, but the most horrible has been his last 

actions".  The patient also reports that she has been feeling hopeless, helpless

, worthless, and is sleeping very poorly.  The patient is now fully oriented 3

, without any fluctuation of consciousness, no attention deficit.  However, as 

per nurses, the patient has been described as disorganized, with periodic 

confusion and agitation, but not aggressive.


Tobacco Use In Past 30 Days:  Refused To Answer


Alcohol Use:  Never


Hospital Course


Patient was admitted in the psychiatric unit due to an overdose after an 

argument with her .  Psychosocial and psychiatric assessment were 

completed.  Safety measures were taken.  At the beginning of the 

hospitalization the patient was endorsing depression in the context of a recent 

important argument and disagreement with her .  She was starting in 

psychotropic medications, individual and group therapy.  The patient showed 

good response to these regimens.  During her stay she in the unit the patient 

was mostly calm, cooperative, compliant with medications.  She participated in 

activities integrated well.  No agitation, no aggressive behavior reported.  

Meeting with family by phone and personally were performed.  At the moment of 

discharge the patient does not present any symptomatology of depression, anxiety

, mireille or psychosis.  She is going to be discharged to her best friend house.  

Best friend agree with the plan.





Results


Blood Pressure


117 / 55





Vital Signs








  Date Time  Temp Pulse Resp B/P (MAP) Pulse Ox O2 Delivery O2 Flow Rate FiO2


 


1/23/18 13:41 99.0 93 18  94   


 


1/23/18 05:50    117/55 (75)    











Laboratory Tests








Test


  1/20/18


21:05 1/22/18


14:22 1/22/18


21:50


 


Urine Turbidity  HAZY (CLEAR)  


 


Urine Protein


  


  100 mg/dL


(NEG-TRACE) 


 


 


Urine Ketones


  


  TRACE mg/dL


(NEG) 


 


 


Urine Occult Blood  SMALL (NEG)  


 


Urine Leukocyte Esterase  TRACE (NEG)  


 


Urine Bacteria


  


  MANY /hpf


(NONE) 


 


 


Urine Mucus  FEW /lpf (OCC)  


 


White Blood Count


  


  


  11.4 TH/MM3


(4.0-11.0)


 


Red Blood Count


  


  


  3.69 MIL/MM3


(4.00-5.30)


 


Hematocrit


  


  


  34.3 %


(35.0-46.0)


 


Neutrophils (%) (Auto)


  


  


  72.8 %


(16.0-70.0)


 


Monocytes (%) (Auto)


  


  


  9.5 %


(0.0-8.0)


 


Neutrophils # (Auto)


  


  


  8.3 TH/MM3


(1.8-7.7)


 


Monocytes # (Auto)


  


  


  1.1 TH/MM3


(0-0.9)


 


Blood Urea Nitrogen


  


  


  22 MG/DL


(7-18)


 


Creatinine


  


  


  1.21 MG/DL


(0.50-1.00)


 


Estimat Glomerular Filtration


Rate 


  


  47 ML/MIN


(>89)








 Laboratory Results








Test


  1/20/18


08:55


 


Cholesterol Level


  139 MG/DL


(120-200)


 


HDL Cholesterol


  63.9 MG/DL


(40.0-60.0)


 


Hemoglobin A1c


  5.9 %


(4.3-6.0)


 


LDL Cholesterol


  50 MG/DL


(0-99)


 


Triglycerides Level


  124 MG/DL


()








Summary of Procedures


NOne


Imaging





Last Impressions








Chest X-Ray 1/22/18 0000 Signed





Impressions: 





 Service Date/Time:  Monday, January 22, 2018 14:01 - CONCLUSION: Bibasilar 





 infiltrates.     Tk Phillips Jr., MD 








Pending results at discharge:  No





Medications


# of Antipsychotic meds at D/C:  0


Approp Antipsych med options


1 - Minimum of three failed multiple trials of monotherapy.


2 - Documented plan to taper to monotherapy due to previous use of multiple 

meds OR cross-taper in progress at D/C.


3 - Documentation of augmentation of Clozapine.


4 - Justification other than those listed in allowable values 1-3, document here

:





Discharge


Discharge Date:  Jan 23, 2018


Discharge Diagnosis:  


(1) Major depressive disorder, single episode


ICD Code:  F32.9 - Major depressive disorder, single episode, unspecified


Pt Condition on Discharge:  Good


Discharge Disposition:  Discharge Home





Discharge Instructions


Diet Instructions:  As Tolerated, No Restrictions


Activities you can perform:  Regular-No Restrictions


Scheduled Appointment:  





Discharge Time


> 30 minutes





Mental Status Examination


Appearance:  Appropriate


Consciousness:  Alert


Orientation:  x4


Motor Activity:  Normal gait


Speech:  Unremarkable


Language:  Adequate


Fund of Knowledge:  Adequate


Attention and Concentration:  Adequate


Memory:  Unremarkable


Mood:  Sad


Affect:  Sad, Anxious


Thought Process & Associations:  Intact


Thought Content:  Appropriate


Hallucination Type:  None


Delusion Type:  None


Suicidal Ideation:  Yes


Suicidal Plan:  No


Suicidal Intention:  No


Homicidal Ideation:  No


Homicidal Plan:  No


Homicidal Intention:  No


Insight:  Poor


Judgment:  Poor





Discharge/Advance Care Plan


Health Problems:  


(1) Major depressive disorder, recurrent


Goals to promote your health


* To prevent worsening of your condition and complications


* To maintain your health at the optimal level


Directions to meet your goals


*** Take your medications as prescribed


***  Follow your dietary instruction


***  Follow activity as directed





***  Keep your appointments as scheduled


***  Take your immunizations and boosters as scheduled


***  If your symptoms worsen call your PCP, if no PCP go to Urgent Care Center 

or Emergency Room ***


***  For 24/7 questions related to your inpatient stay or results of tests 

pending at discharge, please contact Dr. Guevara Estrada at [phone]


***  Smoking is Dangerous to Your Health. Avoid second hand smoking ***











Guevara Estrada MD Jan 23, 2018 14:12

## 2018-01-23 NOTE — HHI.PYPN
Subjective


Remarks


Patient was seen today for psychiatric evaluation.  Chart reviewed.  Case 

discussed with the nurse in charge.  On psychiatric evaluation today patient is 

found walking in the [place], she is calm, cooperative, reports improved mood.  

Patient says that she has a good plan to continue her life without her .

  She will go to live with a girlfriend her house.  She is planning to give 

herself sometimes out of her house.  She reports that her family is in 

agreement with this plan.  At the same time she will continue her psychiatric 

and medical care as an outpatient as recommended.  Today her mood is ok, still 

having some sad thoughts about her situation, difficulty sleeping at night, but 

no suicidal ideation, no homicidal ideation, no visual or auditory 

hallucinations.  She has been compliant with medications, interacting 

appropriately with staff and peers in the unit.





Mental Status Examination


Appearance:  Appropriate


Consciousness:  Alert


Orientation:  x4


Motor Activity:  Normal gait


Speech:  Unremarkable


Language:  Adequate


Fund of Knowledge:  Adequate


Attention and Concentration:  Adequate


Memory:  Unremarkable


Mood:  Sad


Affect:  Sad, Anxious


Thought Process & Associations:  Intact


Thought Content:  Appropriate


Hallucination Type:  None


Delusion Type:  None


Suicidal Ideation:  Yes


Suicidal Plan:  No


Suicidal Intention:  No


Homicidal Ideation:  No


Homicidal Plan:  No


Homicidal Intention:  No


Insight:  Poor


Judgment:  Poor





Results


Labs











Test


  1/22/18


14:22 1/22/18


21:50


 


Urine Color YELLOW  


 


Urine Turbidity HAZY  


 


Urine pH 6.0  


 


Urine Specific Gravity 1.026  


 


Urine Protein 100 mg/dL  


 


Urine Glucose (UA) NEG mg/dL  


 


Urine Ketones TRACE mg/dL  


 


Urine Occult Blood SMALL  


 


Urine Nitrite NEG  


 


Urine Bilirubin NEG  


 


Urine Urobilinogen 2.0 MG/DL  


 


Urine Leukocyte Esterase TRACE  


 


Urine RBC


  LESS THAN 1


/hpf 


 


 


Urine WBC 4 /hpf  


 


Urine Squamous Epithelial


Cells 10 /hpf 


  


 


 


Urine Bacteria MANY /hpf  


 


Urine Mucus FEW /lpf  


 


Microscopic Urinalysis Comment


  CULTURE


INDICATED 


 


 


White Blood Count  11.4 TH/MM3 


 


Red Blood Count  3.69 MIL/MM3 


 


Hemoglobin  11.6 GM/DL 


 


Hematocrit  34.3 % 


 


Mean Corpuscular Volume  92.9 FL 


 


Mean Corpuscular Hemoglobin  31.3 PG 


 


Mean Corpuscular Hemoglobin


Concent 


  33.7 % 


 


 


Red Cell Distribution Width  13.2 % 


 


Platelet Count  269 TH/MM3 


 


Mean Platelet Volume  8.8 FL 


 


Neutrophils (%) (Auto)  72.8 % 


 


Lymphocytes (%) (Auto)  16.8 % 


 


Monocytes (%) (Auto)  9.5 % 


 


Eosinophils (%) (Auto)  0.4 % 


 


Basophils (%) (Auto)  0.5 % 


 


Neutrophils # (Auto)  8.3 TH/MM3 


 


Lymphocytes # (Auto)  1.9 TH/MM3 


 


Monocytes # (Auto)  1.1 TH/MM3 


 


Eosinophils # (Auto)  0.0 TH/MM3 


 


Basophils # (Auto)  0.1 TH/MM3 


 


CBC Comment  DIFF FINAL 


 


Differential Comment   


 


Blood Urea Nitrogen  22 MG/DL 


 


Creatinine  1.21 MG/DL 


 


Random Glucose  94 MG/DL 


 


Calcium Level  8.8 MG/DL 


 


Magnesium Level  2.0 MG/DL 


 


Sodium Level  139 MEQ/L 


 


Potassium Level  3.7 MEQ/L 


 


Chloride Level  105 MEQ/L 


 


Carbon Dioxide Level  25.8 MEQ/L 


 


Anion Gap  8 MEQ/L 


 


Estimat Glomerular Filtration


Rate 


  47 ML/MIN 


 


 


B-Type Natriuretic Peptide  33 PG/ML 














 Date/Time


Source Procedure


Growth Status


 


 


 1/23/18 09:47


Blood Peripheral Aerobic Blood Culture


Pending Received


 


 1/23/18 09:47


Blood Peripheral Anaerobic Blood Culture


Pending Received





 1/22/18 16:45


Sputum Expectorated Sputum Gram Stain - Final Resulted


 


 1/22/18 16:45


Sputum Expectorated Sputum Sputum Culture


Pending Resulted


 


 1/22/18 14:22


Urine Clean Catch Urine Culture


Pending Worksheet








Vitals/IOs





Vital Signs








  Date Time  Temp Pulse Resp B/P (MAP) Pulse Ox O2 Delivery O2 Flow Rate FiO2


 


1/23/18 06:45 101.6       


 


1/23/18 05:50  114 18 117/55 (75) 93   











Assessment & Plan


Problem List:  


(1) Major depressive disorder, recurrent


ICD Codes:  F33.9 - Major depressive disorder, recurrent, unspecified


Assessment & Plan:  Continue current psychotropic regimen.  Patient has 

developed high temperatures, medical team ordered appropriate labs.  

Recommendations appreciated.  Brief supportive psychotherapy provided. 





Assessment & Plan


Estimated LOS:  days


Justification for Cont. Inpt.


Patient has an elevated risk of danger to self at a lower level of care.











Guevara Estrada MD Jan 23, 2018 11:29